# Patient Record
Sex: MALE | Race: WHITE | NOT HISPANIC OR LATINO | Employment: OTHER | URBAN - METROPOLITAN AREA
[De-identification: names, ages, dates, MRNs, and addresses within clinical notes are randomized per-mention and may not be internally consistent; named-entity substitution may affect disease eponyms.]

---

## 2023-03-29 PROBLEM — E66.01 MORBID OBESITY (MULTI): Status: ACTIVE | Noted: 2023-03-29

## 2023-03-29 PROBLEM — N40.1 ENLARGED PROSTATE WITH LOWER URINARY TRACT SYMPTOMS (LUTS): Status: ACTIVE | Noted: 2023-03-29

## 2023-03-29 PROBLEM — R82.90 ABNORMAL URINALYSIS: Status: ACTIVE | Noted: 2023-03-29

## 2023-03-29 PROBLEM — R41.3 MEMORY LOSS: Status: ACTIVE | Noted: 2023-03-29

## 2023-03-29 PROBLEM — E78.5 HYPERLIPIDEMIA: Status: ACTIVE | Noted: 2023-03-29

## 2023-03-29 PROBLEM — I48.91 ATRIAL FIBRILLATION (MULTI): Status: ACTIVE | Noted: 2023-03-29

## 2023-03-29 PROBLEM — N40.0 BENIGN PROSTATE HYPERPLASIA: Status: ACTIVE | Noted: 2023-03-29

## 2023-03-29 PROBLEM — N52.9 MALE ERECTILE DISORDER: Status: ACTIVE | Noted: 2023-03-29

## 2023-03-29 PROBLEM — R07.9 CHEST PAIN: Status: ACTIVE | Noted: 2023-03-29

## 2023-03-29 PROBLEM — I10 BENIGN ESSENTIAL HYPERTENSION: Status: ACTIVE | Noted: 2023-03-29

## 2023-03-29 PROBLEM — E11.9 CONTROLLED DIABETES MELLITUS (MULTI): Status: ACTIVE | Noted: 2023-03-29

## 2023-03-29 RX ORDER — TADALAFIL 20 MG/1
20 TABLET ORAL DAILY PRN
COMMUNITY
Start: 2022-04-27 | End: 2024-04-17 | Stop reason: ALTCHOICE

## 2023-03-29 RX ORDER — METOPROLOL TARTRATE 25 MG/1
25 TABLET, FILM COATED ORAL 2 TIMES DAILY
COMMUNITY
Start: 2015-12-14 | End: 2023-04-04 | Stop reason: SDUPTHER

## 2023-03-29 RX ORDER — BLOOD SUGAR DIAGNOSTIC
STRIP MISCELLANEOUS
COMMUNITY
Start: 2015-12-14

## 2023-03-29 RX ORDER — LOSARTAN POTASSIUM 100 MG/1
100 TABLET ORAL DAILY
COMMUNITY
Start: 2019-12-20 | End: 2023-04-04 | Stop reason: SDUPTHER

## 2023-03-29 RX ORDER — LOSARTAN POTASSIUM AND HYDROCHLOROTHIAZIDE 25; 100 MG/1; MG/1
1 TABLET ORAL DAILY
COMMUNITY
Start: 2017-03-14 | End: 2023-04-05

## 2023-03-29 RX ORDER — RIVAROXABAN 15 MG-20MG
1 KIT ORAL
COMMUNITY
Start: 2022-04-27 | End: 2023-04-05 | Stop reason: ALTCHOICE

## 2023-03-29 RX ORDER — HYDROCHLOROTHIAZIDE 25 MG/1
25 TABLET ORAL DAILY
COMMUNITY
Start: 2019-12-20 | End: 2023-04-04 | Stop reason: SDUPTHER

## 2023-03-29 RX ORDER — METFORMIN HYDROCHLORIDE 500 MG/1
500 TABLET ORAL DAILY
COMMUNITY
Start: 2017-10-18 | End: 2023-04-04 | Stop reason: SDUPTHER

## 2023-03-29 RX ORDER — TERAZOSIN 10 MG/1
1 CAPSULE ORAL DAILY
COMMUNITY
Start: 2015-12-14 | End: 2023-04-04 | Stop reason: SDUPTHER

## 2023-03-29 RX ORDER — ATORVASTATIN CALCIUM 40 MG/1
40 TABLET, FILM COATED ORAL NIGHTLY
COMMUNITY
Start: 2015-12-14 | End: 2023-04-04 | Stop reason: SDUPTHER

## 2023-04-04 ENCOUNTER — TELEPHONE (OUTPATIENT)
Dept: PRIMARY CARE | Facility: CLINIC | Age: 73
End: 2023-04-04
Payer: MEDICARE

## 2023-04-04 DIAGNOSIS — E78.2 MIXED HYPERLIPIDEMIA: ICD-10-CM

## 2023-04-04 DIAGNOSIS — E11.9 CONTROLLED TYPE 2 DIABETES MELLITUS WITHOUT COMPLICATION, WITHOUT LONG-TERM CURRENT USE OF INSULIN (MULTI): ICD-10-CM

## 2023-04-04 DIAGNOSIS — I10 BENIGN ESSENTIAL HYPERTENSION: ICD-10-CM

## 2023-04-04 DIAGNOSIS — N40.0 BENIGN PROSTATIC HYPERPLASIA, UNSPECIFIED WHETHER LOWER URINARY TRACT SYMPTOMS PRESENT: ICD-10-CM

## 2023-04-04 DIAGNOSIS — I48.0 PAROXYSMAL ATRIAL FIBRILLATION (MULTI): Primary | ICD-10-CM

## 2023-04-05 RX ORDER — HYDROCHLOROTHIAZIDE 25 MG/1
25 TABLET ORAL DAILY
Qty: 90 TABLET | Refills: 3 | Status: SHIPPED | OUTPATIENT
Start: 2023-04-05 | End: 2024-04-17 | Stop reason: SDUPTHER

## 2023-04-05 RX ORDER — ATORVASTATIN CALCIUM 40 MG/1
40 TABLET, FILM COATED ORAL NIGHTLY
Qty: 90 TABLET | Refills: 3 | Status: SHIPPED | OUTPATIENT
Start: 2023-04-05 | End: 2024-04-17 | Stop reason: SDUPTHER

## 2023-04-05 RX ORDER — TERAZOSIN 10 MG/1
10 CAPSULE ORAL DAILY
Qty: 90 CAPSULE | Refills: 3 | Status: SHIPPED | OUTPATIENT
Start: 2023-04-05 | End: 2024-04-17 | Stop reason: SDUPTHER

## 2023-04-05 RX ORDER — LOSARTAN POTASSIUM 100 MG/1
100 TABLET ORAL DAILY
Qty: 90 TABLET | Refills: 3 | Status: SHIPPED | OUTPATIENT
Start: 2023-04-05 | End: 2024-04-17 | Stop reason: SDUPTHER

## 2023-04-05 RX ORDER — METFORMIN HYDROCHLORIDE 500 MG/1
500 TABLET ORAL
Qty: 180 TABLET | Refills: 3 | Status: SHIPPED | OUTPATIENT
Start: 2023-04-05 | End: 2024-04-17 | Stop reason: SDUPTHER

## 2023-04-05 RX ORDER — METOPROLOL TARTRATE 25 MG/1
25 TABLET, FILM COATED ORAL 2 TIMES DAILY
Qty: 180 TABLET | Refills: 3 | Status: SHIPPED | OUTPATIENT
Start: 2023-04-05 | End: 2024-04-17 | Stop reason: SDUPTHER

## 2023-04-05 NOTE — TELEPHONE ENCOUNTER
Refills sent  Prior A1C <6% on current DM regimen  Normal renal function   Recent repeat CBC without persistent concerns of polycythemia   Visit to establish care with me in 3 weeks    Paul Ramirez MD   Internal Medicine-Pediatrics

## 2023-04-26 ENCOUNTER — APPOINTMENT (OUTPATIENT)
Dept: PRIMARY CARE | Facility: CLINIC | Age: 73
End: 2023-04-26
Payer: MEDICARE

## 2023-04-26 ENCOUNTER — OFFICE VISIT (OUTPATIENT)
Dept: PRIMARY CARE | Facility: CLINIC | Age: 73
End: 2023-04-26
Payer: MEDICARE

## 2023-04-26 VITALS
RESPIRATION RATE: 18 BRPM | SYSTOLIC BLOOD PRESSURE: 129 MMHG | WEIGHT: 315 LBS | BODY MASS INDEX: 47.74 KG/M2 | DIASTOLIC BLOOD PRESSURE: 71 MMHG | OXYGEN SATURATION: 97 % | HEIGHT: 68 IN

## 2023-04-26 DIAGNOSIS — E66.01 MORBID OBESITY (MULTI): ICD-10-CM

## 2023-04-26 DIAGNOSIS — D75.1 POLYCYTHEMIA: ICD-10-CM

## 2023-04-26 DIAGNOSIS — E78.49 OTHER HYPERLIPIDEMIA: ICD-10-CM

## 2023-04-26 DIAGNOSIS — E11.8 CONTROLLED TYPE 2 DIABETES MELLITUS WITH COMPLICATION, WITHOUT LONG-TERM CURRENT USE OF INSULIN (MULTI): Primary | ICD-10-CM

## 2023-04-26 DIAGNOSIS — I48.91 ATRIAL FIBRILLATION, UNSPECIFIED TYPE (MULTI): ICD-10-CM

## 2023-04-26 DIAGNOSIS — Z12.5 PROSTATE CANCER SCREENING: ICD-10-CM

## 2023-04-26 DIAGNOSIS — G47.33 OBSTRUCTIVE SLEEP APNEA SYNDROME: ICD-10-CM

## 2023-04-26 DIAGNOSIS — E55.9 MILD VITAMIN D DEFICIENCY: ICD-10-CM

## 2023-04-26 LAB
ALANINE AMINOTRANSFERASE (SGPT) (U/L) IN SER/PLAS: 21 U/L (ref 10–52)
ALBUMIN (G/DL) IN SER/PLAS: 4.2 G/DL (ref 3.4–5)
ALBUMIN (MG/L) IN URINE: 7.1 MG/L
ALBUMIN/CREATININE (UG/MG) IN URINE: 7.1 UG/MG CRT (ref 0–30)
ALKALINE PHOSPHATASE (U/L) IN SER/PLAS: 130 U/L (ref 33–136)
ANION GAP IN SER/PLAS: 16 MMOL/L (ref 10–20)
ASPARTATE AMINOTRANSFERASE (SGOT) (U/L) IN SER/PLAS: 20 U/L (ref 9–39)
BASOPHILS (10*3/UL) IN BLOOD BY AUTOMATED COUNT: 0.04 X10E9/L (ref 0–0.1)
BASOPHILS/100 LEUKOCYTES IN BLOOD BY AUTOMATED COUNT: 0.5 % (ref 0–2)
BILIRUBIN TOTAL (MG/DL) IN SER/PLAS: 1.2 MG/DL (ref 0–1.2)
CALCIDIOL (25 OH VITAMIN D3) (NG/ML) IN SER/PLAS: 27 NG/ML
CALCIUM (MG/DL) IN SER/PLAS: 9.9 MG/DL (ref 8.6–10.6)
CARBON DIOXIDE, TOTAL (MMOL/L) IN SER/PLAS: 23 MMOL/L (ref 21–32)
CHLORIDE (MMOL/L) IN SER/PLAS: 104 MMOL/L (ref 98–107)
CHOLESTEROL (MG/DL) IN SER/PLAS: 91 MG/DL (ref 0–199)
CHOLESTEROL IN HDL (MG/DL) IN SER/PLAS: 39.9 MG/DL
CHOLESTEROL/HDL RATIO: 2.3
CREATININE (MG/DL) IN SER/PLAS: 0.98 MG/DL (ref 0.5–1.3)
CREATININE (MG/DL) IN URINE: 100 MG/DL (ref 20–370)
EOSINOPHILS (10*3/UL) IN BLOOD BY AUTOMATED COUNT: 0.05 X10E9/L (ref 0–0.7)
EOSINOPHILS/100 LEUKOCYTES IN BLOOD BY AUTOMATED COUNT: 0.6 % (ref 0–6)
ERYTHROCYTE DISTRIBUTION WIDTH (RATIO) BY AUTOMATED COUNT: 14.3 % (ref 11.5–14.5)
ERYTHROCYTE MEAN CORPUSCULAR HEMOGLOBIN CONCENTRATION (G/DL) BY AUTOMATED: 36.2 G/DL (ref 32–36)
ERYTHROCYTE MEAN CORPUSCULAR VOLUME (FL) BY AUTOMATED COUNT: 93 FL (ref 80–100)
ERYTHROCYTES (10*6/UL) IN BLOOD BY AUTOMATED COUNT: 4.68 X10E12/L (ref 4.5–5.9)
GFR MALE: 82 ML/MIN/1.73M2
GLUCOSE (MG/DL) IN SER/PLAS: 94 MG/DL (ref 74–99)
HEMATOCRIT (%) IN BLOOD BY AUTOMATED COUNT: 43.7 % (ref 41–52)
HEMOGLOBIN (G/DL) IN BLOOD: 15.8 G/DL (ref 13.5–17.5)
IMMATURE GRANULOCYTES/100 LEUKOCYTES IN BLOOD BY AUTOMATED COUNT: 0.6 % (ref 0–0.9)
LDL: 32 MG/DL (ref 0–99)
LEUKOCYTES (10*3/UL) IN BLOOD BY AUTOMATED COUNT: 8.1 X10E9/L (ref 4.4–11.3)
LYMPHOCYTES (10*3/UL) IN BLOOD BY AUTOMATED COUNT: 1.51 X10E9/L (ref 1.2–4.8)
LYMPHOCYTES/100 LEUKOCYTES IN BLOOD BY AUTOMATED COUNT: 18.7 % (ref 13–44)
MONOCYTES (10*3/UL) IN BLOOD BY AUTOMATED COUNT: 0.71 X10E9/L (ref 0.1–1)
MONOCYTES/100 LEUKOCYTES IN BLOOD BY AUTOMATED COUNT: 8.8 % (ref 2–10)
NEUTROPHILS (10*3/UL) IN BLOOD BY AUTOMATED COUNT: 5.7 X10E9/L (ref 1.2–7.7)
NEUTROPHILS/100 LEUKOCYTES IN BLOOD BY AUTOMATED COUNT: 70.8 % (ref 40–80)
NRBC (PER 100 WBCS) BY AUTOMATED COUNT: 0 /100 WBC (ref 0–0)
PLATELETS (10*3/UL) IN BLOOD AUTOMATED COUNT: 160 X10E9/L (ref 150–450)
POTASSIUM (MMOL/L) IN SER/PLAS: 4.1 MMOL/L (ref 3.5–5.3)
PROTEIN TOTAL: 7 G/DL (ref 6.4–8.2)
SODIUM (MMOL/L) IN SER/PLAS: 139 MMOL/L (ref 136–145)
THYROTROPIN (MIU/L) IN SER/PLAS BY DETECTION LIMIT <= 0.05 MIU/L: 1.35 MIU/L (ref 0.44–3.98)
TRIGLYCERIDE (MG/DL) IN SER/PLAS: 98 MG/DL (ref 0–149)
UREA NITROGEN (MG/DL) IN SER/PLAS: 17 MG/DL (ref 6–23)
VLDL: 20 MG/DL (ref 0–40)

## 2023-04-26 PROCEDURE — 84154 ASSAY OF PSA FREE: CPT

## 2023-04-26 PROCEDURE — 80061 LIPID PANEL: CPT

## 2023-04-26 PROCEDURE — 1036F TOBACCO NON-USER: CPT | Performed by: STUDENT IN AN ORGANIZED HEALTH CARE EDUCATION/TRAINING PROGRAM

## 2023-04-26 PROCEDURE — 3078F DIAST BP <80 MM HG: CPT | Performed by: STUDENT IN AN ORGANIZED HEALTH CARE EDUCATION/TRAINING PROGRAM

## 2023-04-26 PROCEDURE — 3044F HG A1C LEVEL LT 7.0%: CPT | Performed by: STUDENT IN AN ORGANIZED HEALTH CARE EDUCATION/TRAINING PROGRAM

## 2023-04-26 PROCEDURE — 84153 ASSAY OF PSA TOTAL: CPT

## 2023-04-26 PROCEDURE — 82043 UR ALBUMIN QUANTITATIVE: CPT

## 2023-04-26 PROCEDURE — 80053 COMPREHEN METABOLIC PANEL: CPT

## 2023-04-26 PROCEDURE — 82306 VITAMIN D 25 HYDROXY: CPT

## 2023-04-26 PROCEDURE — 82570 ASSAY OF URINE CREATININE: CPT

## 2023-04-26 PROCEDURE — 99214 OFFICE O/P EST MOD 30 MIN: CPT | Performed by: STUDENT IN AN ORGANIZED HEALTH CARE EDUCATION/TRAINING PROGRAM

## 2023-04-26 PROCEDURE — 3074F SYST BP LT 130 MM HG: CPT | Performed by: STUDENT IN AN ORGANIZED HEALTH CARE EDUCATION/TRAINING PROGRAM

## 2023-04-26 PROCEDURE — 85025 COMPLETE CBC W/AUTO DIFF WBC: CPT

## 2023-04-26 PROCEDURE — 84443 ASSAY THYROID STIM HORMONE: CPT

## 2023-04-26 PROCEDURE — 1159F MED LIST DOCD IN RCRD: CPT | Performed by: STUDENT IN AN ORGANIZED HEALTH CARE EDUCATION/TRAINING PROGRAM

## 2023-04-26 PROCEDURE — 4010F ACE/ARB THERAPY RXD/TAKEN: CPT | Performed by: STUDENT IN AN ORGANIZED HEALTH CARE EDUCATION/TRAINING PROGRAM

## 2023-04-26 PROCEDURE — 36415 COLL VENOUS BLD VENIPUNCTURE: CPT | Performed by: STUDENT IN AN ORGANIZED HEALTH CARE EDUCATION/TRAINING PROGRAM

## 2023-04-26 PROCEDURE — 83036 HEMOGLOBIN GLYCOSYLATED A1C: CPT

## 2023-04-26 NOTE — PROGRESS NOTES
Noe Quan is a 72 y.o. male seen in Clinic at [facility] by Dr. Paul Ramirez on 04/26/23 for routine care, as well as for management of the following chronic medical conditions: morbid obesity, HTN, DLD, Afib, BPH, T2DM.     #HTN  - hydrochlorothiazide 25, Losartan 100mg daily, Metoprolol tartrate 25mg BID   - BP well controlled in office today   - RFP today with reassuring renal function     #DLD  - Atorva 40  - labs well controlled on current regimen     #Afib  - B-blocker as above, Xarelto 20mg daily     #BPH, Erectile Dysfunction  - Tadalafil 20mg, Terazosin 10mg daily     #Polycythemia  - seen by hematology, repeat labs reassuring, no additional workup at this time   - EPO 17, ferritin in 100s   - STEVE testing     #T2DM, morbid obesity   - Metformin 500mg BID   - A1C 5.9% on current regimen     Past Medical History: as above   No past medical history on file.  Subspecialty Medical Care: recent hematology     Past Surgical History:   No past surgical history on file.    Medications:  Current Outpatient Medications:     atorvastatin (Lipitor) 40 mg tablet, Take 1 tablet (40 mg) by mouth once daily at bedtime., Disp: 90 tablet, Rfl: 3    hydroCHLOROthiazide (HYDRODiuril) 25 mg tablet, Take 1 tablet (25 mg) by mouth once daily., Disp: 90 tablet, Rfl: 3    losartan (Cozaar) 100 mg tablet, Take 1 tablet (100 mg) by mouth once daily., Disp: 90 tablet, Rfl: 3    metFORMIN (Glucophage) 500 mg tablet, Take 1 tablet (500 mg) by mouth in the morning and 1 tablet (500 mg) in the evening. Take with meals., Disp: 180 tablet, Rfl: 3    metoprolol tartrate (Lopressor) 25 mg tablet, Take 1 tablet (25 mg) by mouth in the morning and 1 tablet (25 mg) before bedtime., Disp: 180 tablet, Rfl: 3    OneTouch Ultra Test strip, TEST BLOOD SUGAR ONCE A DAY, Disp: , Rfl:     rivaroxaban (Xarelto) 20 mg tablet, Take 1 tablet (20 mg) by mouth once daily., Disp: 90 tablet, Rfl: 1    tadalafil 20 mg tablet, Take 1 tablet (20 mg) by  mouth once daily as needed for erectile dysfunction., Disp: , Rfl:     terazosin (Hytrin) 10 mg capsule, Take 1 capsule (10 mg) by mouth once daily., Disp: 90 capsule, Rfl: 3  Pharmacy: [facility] ([address])    Allergies: Clarithromycin, PCN   Allergies   Allergen Reactions    Clarithromycin Nausea Only    Penicillins Rash     Immunizations: Flu 2022 UTD, COVID including bivalent booster, PCV/PPSV UTD, unknown last Tdap; Shingrix recommended     Family History:   No family history on file.    Social History:   Home/Living Situation/Falls/Safety Assessment: lives alone  Education/Employment/Work/Vocational:   Activities: limited physical activity   Drug Use: never smoker, rare alcohol use  Diet: T2DM diet discussion   Depression/Anxiety: denies   Sexuality/Contraception/Menstrual History:   Sleep: STEVE screening today     Patient Information:  Health Insurance: Medicare   Transportation: [employer] POA/Guardian: emergency contact, Niece will likely be assigned (Theresa Quiroz, [phone]); Nephew, Ralf Quan, [phone]  Contact Information: [phone]    Visit Vitals  /71 (BP Location: Left arm, Patient Position: Sitting)      PHYSICAL EXAM:   General: well appearing  male, pleasant and engaged in encounter    HEENT: NCAT, MMM  CV: irregularly irregular   PULM: CTAB, non-labored respirations   ABD: soft, obese, NT, ND   : no suprapubic or CVA tenderness   EXT: WWP, no significant edema   SKIN: no rashes noted   NEURO: A&Ox4, symmetric facies, no gross motor or sensory deficits, normal gait  PSYCH: pleasant mood, appropriate affect     Assessment/Plan    Noe Quan is a 72 y.o. male seen in Clinic at [facility] by Dr. Paul Ramirez on 04/26/23 for routine care, as well as for management of the following chronic medical conditions: morbid obesity, HTN, DLD, Afib, BPH, T2DM.     #HTN  - hydrochlorothiazide 25, Losartan 100mg daily, Metoprolol tartrate 25mg BID   - BP well  controlled in office today   - RFP today with reassuring renal function     #DLD  - Atorva 40  - labs well controlled on current regimen     #Afib  - B-blocker as above, Xarelto 20mg daily     #BPH, Erectile Dysfunction  - Tadalafil 20mg, Terazosin 10mg daily     #Polycythemia  - seen by hematology, repeat labs reassuring, no additional workup at this time   - EPO 17, ferritin in 100s   - STEVE testing     #T2DM, morbid obesity   - Metformin 500mg BID   - A1C 5.9% on current regimen     #Health Maintenance    Cancer Screening  - Colorectal Cancer Screening: unclear, discuss at CPE   - Lung Cancer Screening: non-smoker   - Prostate Cancer Screening: PSA today     Laboratory Screening  - Lipid Screen: on statin   - ASCVD Score: on statin   - A1C, glucose screen: labs today   - STI, HIV, Hep B screen: defers  - Hep C screen: defers    Imaging Screening  - AAA screening: non-smoker     Immunizations:   - Influenza: annual   - COVID: has received bivalent booster   - Tdap: unknown  - Prevnar, Pneumovax: complete   - Shingrix: recommended     Other Screening  - Health Literacy Assessment: adequate   - Depression screen: negative   - Home safety/partner violence screen: lives alone   - Hearing/Vision screens:   - Alcohol/tobacco/drug use screen: non-smoker   - Healthcare POA/Advanced Directives: niece, nephew     Referrals: labs, sleep study     Patient Discussion:    Please call back the office with any questions at [phone]. In the case of an emergency, please call [phone] or go to the nearest Emergency Department.      Paul Ramirez MD  Internal Medicine-Pediatrics  [facility] [address], [address]  P: [phone], F: [phone]

## 2023-04-27 ENCOUNTER — APPOINTMENT (OUTPATIENT)
Dept: PRIMARY CARE | Facility: CLINIC | Age: 73
End: 2023-04-27
Payer: MEDICARE

## 2023-04-27 LAB
ESTIMATED AVERAGE GLUCOSE FOR HBA1C: 123 MG/DL
HEMOGLOBIN A1C/HEMOGLOBIN TOTAL IN BLOOD: 5.9 %

## 2023-04-29 LAB
PROSTATE SPECIFIC AG (NG/ML) IN SER/PLAS: 3.2 NG/ML (ref 0–4)
PROSTATE SPECIFIC AG FREE (NG/ML) IN SER/PLAS: 1.1 NG/ML
PROSTATE SPECIFIC AG FREE/PROSTATE SPECIFIC AG TOTAL IN SER/PLAS: 34 %

## 2023-05-23 DIAGNOSIS — E66.01 MORBID OBESITY (MULTI): ICD-10-CM

## 2023-05-23 DIAGNOSIS — G47.33 OBSTRUCTIVE SLEEP APNEA SYNDROME: ICD-10-CM

## 2023-08-15 ENCOUNTER — TELEPHONE (OUTPATIENT)
Dept: PRIMARY CARE | Facility: CLINIC | Age: 73
End: 2023-08-15

## 2023-09-10 PROBLEM — E11.9 ABNORMAL METABOLIC STATE DUE TO DIABETES MELLITUS (MULTI): Status: ACTIVE | Noted: 2023-09-10

## 2023-09-10 PROBLEM — H04.129 TEAR FILM INSUFFICIENCY: Status: ACTIVE | Noted: 2023-09-10

## 2023-09-10 PROBLEM — H25.10 NUCLEAR SENILE CATARACT: Status: ACTIVE | Noted: 2023-09-10

## 2023-09-10 PROBLEM — H52.7 UNSPECIFIED DISORDER OF REFRACTION: Status: ACTIVE | Noted: 2023-09-10

## 2023-10-21 DIAGNOSIS — I48.0 PAROXYSMAL ATRIAL FIBRILLATION (MULTI): ICD-10-CM

## 2023-10-21 RX ORDER — RIVAROXABAN 20 MG/1
20 TABLET, FILM COATED ORAL DAILY
Qty: 90 TABLET | Refills: 1 | Status: SHIPPED | OUTPATIENT
Start: 2023-10-21 | End: 2023-10-26 | Stop reason: SDUPTHER

## 2023-10-26 DIAGNOSIS — I48.0 PAROXYSMAL ATRIAL FIBRILLATION (MULTI): ICD-10-CM

## 2023-10-31 ENCOUNTER — LAB (OUTPATIENT)
Dept: LAB | Facility: LAB | Age: 73
End: 2023-10-31
Payer: MEDICARE

## 2023-10-31 ENCOUNTER — OFFICE VISIT (OUTPATIENT)
Dept: PRIMARY CARE | Facility: CLINIC | Age: 73
End: 2023-10-31
Payer: MEDICARE

## 2023-10-31 VITALS
HEIGHT: 68 IN | HEART RATE: 67 BPM | SYSTOLIC BLOOD PRESSURE: 118 MMHG | DIASTOLIC BLOOD PRESSURE: 56 MMHG | BODY MASS INDEX: 50.78 KG/M2 | OXYGEN SATURATION: 95 %

## 2023-10-31 DIAGNOSIS — R25.1 TREMOR: ICD-10-CM

## 2023-10-31 DIAGNOSIS — E55.9 MILD VITAMIN D DEFICIENCY: ICD-10-CM

## 2023-10-31 DIAGNOSIS — R32 URINARY INCONTINENCE, UNSPECIFIED TYPE: ICD-10-CM

## 2023-10-31 DIAGNOSIS — I48.91 ATRIAL FIBRILLATION, UNSPECIFIED TYPE (MULTI): ICD-10-CM

## 2023-10-31 DIAGNOSIS — E11.8 CONTROLLED TYPE 2 DIABETES MELLITUS WITH COMPLICATION, WITHOUT LONG-TERM CURRENT USE OF INSULIN (MULTI): ICD-10-CM

## 2023-10-31 DIAGNOSIS — Z23 IMMUNIZATION DUE: ICD-10-CM

## 2023-10-31 DIAGNOSIS — E11.8 CONTROLLED TYPE 2 DIABETES MELLITUS WITH COMPLICATION, WITHOUT LONG-TERM CURRENT USE OF INSULIN (MULTI): Primary | ICD-10-CM

## 2023-10-31 DIAGNOSIS — Z12.11 COLON CANCER SCREENING: ICD-10-CM

## 2023-10-31 LAB
25(OH)D3 SERPL-MCNC: 36 NG/ML (ref 30–100)
ALBUMIN SERPL BCP-MCNC: 4.1 G/DL (ref 3.4–5)
ALP SERPL-CCNC: 124 U/L (ref 33–136)
ALT SERPL W P-5'-P-CCNC: 24 U/L (ref 10–52)
ANION GAP SERPL CALC-SCNC: 14 MMOL/L (ref 10–20)
AST SERPL W P-5'-P-CCNC: 22 U/L (ref 9–39)
BILIRUB SERPL-MCNC: 1.1 MG/DL (ref 0–1.2)
BUN SERPL-MCNC: 15 MG/DL (ref 6–23)
CALCIUM SERPL-MCNC: 9.9 MG/DL (ref 8.6–10.6)
CHLORIDE SERPL-SCNC: 103 MMOL/L (ref 98–107)
CO2 SERPL-SCNC: 28 MMOL/L (ref 21–32)
CREAT SERPL-MCNC: 1.16 MG/DL (ref 0.5–1.3)
GFR SERPL CREATININE-BSD FRML MDRD: 67 ML/MIN/1.73M*2
GLUCOSE SERPL-MCNC: 100 MG/DL (ref 74–99)
POTASSIUM SERPL-SCNC: 4.3 MMOL/L (ref 3.5–5.3)
PROT SERPL-MCNC: 7 G/DL (ref 6.4–8.2)
SODIUM SERPL-SCNC: 141 MMOL/L (ref 136–145)

## 2023-10-31 PROCEDURE — 99214 OFFICE O/P EST MOD 30 MIN: CPT | Performed by: STUDENT IN AN ORGANIZED HEALTH CARE EDUCATION/TRAINING PROGRAM

## 2023-10-31 PROCEDURE — 4010F ACE/ARB THERAPY RXD/TAKEN: CPT | Performed by: STUDENT IN AN ORGANIZED HEALTH CARE EDUCATION/TRAINING PROGRAM

## 2023-10-31 PROCEDURE — 82306 VITAMIN D 25 HYDROXY: CPT

## 2023-10-31 PROCEDURE — 3074F SYST BP LT 130 MM HG: CPT | Performed by: STUDENT IN AN ORGANIZED HEALTH CARE EDUCATION/TRAINING PROGRAM

## 2023-10-31 PROCEDURE — 3044F HG A1C LEVEL LT 7.0%: CPT | Performed by: STUDENT IN AN ORGANIZED HEALTH CARE EDUCATION/TRAINING PROGRAM

## 2023-10-31 PROCEDURE — 83036 HEMOGLOBIN GLYCOSYLATED A1C: CPT

## 2023-10-31 PROCEDURE — 80053 COMPREHEN METABOLIC PANEL: CPT

## 2023-10-31 PROCEDURE — 90662 IIV NO PRSV INCREASED AG IM: CPT | Performed by: STUDENT IN AN ORGANIZED HEALTH CARE EDUCATION/TRAINING PROGRAM

## 2023-10-31 PROCEDURE — 36415 COLL VENOUS BLD VENIPUNCTURE: CPT

## 2023-10-31 PROCEDURE — 81001 URINALYSIS AUTO W/SCOPE: CPT

## 2023-10-31 PROCEDURE — 1036F TOBACCO NON-USER: CPT | Performed by: STUDENT IN AN ORGANIZED HEALTH CARE EDUCATION/TRAINING PROGRAM

## 2023-10-31 PROCEDURE — 1159F MED LIST DOCD IN RCRD: CPT | Performed by: STUDENT IN AN ORGANIZED HEALTH CARE EDUCATION/TRAINING PROGRAM

## 2023-10-31 PROCEDURE — 3078F DIAST BP <80 MM HG: CPT | Performed by: STUDENT IN AN ORGANIZED HEALTH CARE EDUCATION/TRAINING PROGRAM

## 2023-10-31 PROCEDURE — G0008 ADMIN INFLUENZA VIRUS VAC: HCPCS | Performed by: STUDENT IN AN ORGANIZED HEALTH CARE EDUCATION/TRAINING PROGRAM

## 2023-10-31 NOTE — PROGRESS NOTES
Noe Quan is a 73 y.o. male seen in Clinic at [facility] [place] by Dr. Paul Ramirez on 10/31/23 for routine care, as well as for management of the following chronic medical conditions: morbid obesity, HTN, DLD, Afib, BPH, T2DM. He presents today for 6 month follow up visit.    CONCERNS:   - R Arm Tremor; patient has difficult discerning rest vs. Intention   - Dizziness, mainly with positional changes; patient is on several blood pressure medications including B-blocker which inhibits his HR compensatory changes with getting up   - Gait instability, started using a cane for balance and positional support in last 2-3 years   Additionally--patient has slight cogwheeling on exam (not pronounced), is softer spoken (unclear if this has evolved or progressed, patient does not believe so), complains of some intermittent urinary symptoms/incontinence  Given constellation of symptoms, will refer to neurology for any additional thoughts related to possible underlying movement disorder   [  ] neuro referral  If not, positional changes likely more in setting of BP meds and will manage/adjust accordingly; gradual positional changes recommended today     CHRONIC MEDICAL CONDITIONS:   #HTN  - hydrochlorothiazide 25, Losartan 100mg daily, Metoprolol tartrate 25mg BID   - BP well controlled in office today   - RFP today with reassuring renal function   [  ] may consider dose reduction of Metoprolol given above symptoms     #DLD  - Atorva 40  - labs well controlled on current regimen     #Afib  - B-blocker as above, Xarelto 20mg daily   [  ] Handicap disability placard     #BPH, Erectile Dysfunction, Urinary Incontinence   - Tadalafil 20mg, Terazosin 10mg daily (takes at night before bed to try and mitigate side effects)   [  ] UA with labs today     #Polycythemia  - seen by hematology, repeat labs reassuring, no additional workup at this time   - EPO 17, ferritin in 100s   - STEVE testing, likely etiology; patient REFUSES and does not  wish to see sleep medicine at this time    #T2DM, morbid obesity   - Metformin 500mg BID   - ARB for renoprotection   - A1C 5.9% on current regimen   [  ] repeat A1C, CMP today     Past Medical History: as above   No past medical history on file.  Subspecialty Medical Care: prior hematology     Past Surgical History:   No past surgical history on file.    Medications:  Current Outpatient Medications:     atorvastatin (Lipitor) 40 mg tablet, Take 1 tablet (40 mg) by mouth once daily at bedtime., Disp: 90 tablet, Rfl: 3    hydroCHLOROthiazide (HYDRODiuril) 25 mg tablet, Take 1 tablet (25 mg) by mouth once daily., Disp: 90 tablet, Rfl: 3    losartan (Cozaar) 100 mg tablet, Take 1 tablet (100 mg) by mouth once daily., Disp: 90 tablet, Rfl: 3    metFORMIN (Glucophage) 500 mg tablet, Take 1 tablet (500 mg) by mouth in the morning and 1 tablet (500 mg) in the evening. Take with meals., Disp: 180 tablet, Rfl: 3    metoprolol tartrate (Lopressor) 25 mg tablet, Take 1 tablet (25 mg) by mouth in the morning and 1 tablet (25 mg) before bedtime., Disp: 180 tablet, Rfl: 3    rivaroxaban (Xarelto) 20 mg tablet, Take 1 tablet (20 mg) by mouth once daily., Disp: 30 tablet, Rfl: 3    terazosin (Hytrin) 10 mg capsule, Take 1 capsule (10 mg) by mouth once daily., Disp: 90 capsule, Rfl: 3    OneTouch Ultra Test strip, TEST BLOOD SUGAR ONCE A DAY, Disp: , Rfl:     tadalafil 20 mg tablet, Take 1 tablet (20 mg) by mouth once daily as needed for erectile dysfunction., Disp: , Rfl:   Ph[facility]acy: [facility] ([facility])    Allergies: Clarithromycin, PCN   Allergies   Allergen Reactions    Clarithromycin Nausea Only    Penicillins Rash     Immunizations:   - Flu today  - Staying UTD with COVID boosters recommended  - RSV recommedned  - PCV/PPSV UTD  - unknown last Tdap  - Shingrix previously recommended     Family History:   No family history on file.    Social History:   Home/Living Situation/Falls/Safety Assessment: lives  "alone  Education/Employment/Work/Vocational:   Activities: limited physical activity   Drug Use: never smoker, rare alcohol use  Diet: T2DM diet discussion   Depression/Anxiety: denies   Sexuality/Contraception/Menstrual History:   Sleep: STEVE concerns but defers workup     Patient Information:  Health Insurance: Medicare   Transportation: [employer]   Healthcare POA/Guardian: emergency contact, Niece will likely be assigned (Theresa Quiroz, [phone]); Nephew, Ralf Quan, [phone]  Contact Information: [phone]    Visit Vitals  /56   Pulse 67   Ht 1.727 m (5' 8\")   SpO2 95%   BMI 50.78 kg/m²   Smoking Status Never   BSA 2.7 m²      PHYSICAL EXAM:   General: well appearing  male, pleasant and engaged in encounter    HEENT: NCAT, MMM, large neck circumference   CV: irregularly irregular   PULM: CTAB, non-labored respirations   ABD: soft, obese, NT, ND   : no suprapubic or CVA tenderness   EXT: WWP, no significant edema   SKIN: no rashes noted   NEURO: A&Ox4, symmetric facies, soft spoken, slight cogwheeling noted in upper extremities (though very subtle), no significant tremor noted during exam today, no gross motor or sensory deficits, cane for ambulation   PSYCH: pleasant mood, appropriate affect     Assessment/Plan    Noe Quan is a 73 y.o. male seen in Clinic at [facility] [place]. Paul Ramirez on 10/31/23 for routine care, as well as for management of the following chronic medical conditions: morbid obesity, HTN, DLD, Afib, BPH, T2DM. He presents today for 6 month follow up visit.    CONCERNS:   - R Arm Tremor; patient has difficult discerning rest vs. Intention   - Dizziness, mainly with positional changes; patient is on several blood pressure medications including B-blocker which inhibits his HR compensatory changes with getting up   - Gait instability, started using a cane for balance and positional support in last 2-3 years   Additionally--patient has slight cogwheeling on " exam (not pronounced), is softer spoken (unclear if this has evolved or progressed, patient does not believe so), complains of some intermittent urinary symptoms/incontinence  Given constellation of symptoms, will refer to neurology for any additional thoughts related to possible underlying movement disorder   [  ] neuro referral  If not, positional changes likely more in setting of BP meds and will manage/adjust accordingly; gradual positional changes recommended today     CHRONIC MEDICAL CONDITIONS:   #HTN  - hydrochlorothiazide 25, Losartan 100mg daily, Metoprolol tartrate 25mg BID   - BP well controlled in office today   - RFP today with reassuring renal function   [  ] may consider dose reduction of Metoprolol given above symptoms     #DLD  - Atorva 40  - labs well controlled on current regimen     #Afib  - B-blocker as above, Xarelto 20mg daily   [  ] Handicap disability placard     #BPH, Erectile Dysfunction, Urinary Incontinence   - Tadalafil 20mg, Terazosin 10mg daily (takes at night before bed to try and mitigate side effects)   [  ] UA with labs today     #Polycythemia  - seen by hematology, repeat labs reassuring, no additional workup at this time   - EPO 17, ferritin in 100s   - STEVE testing, likely etiology; patient REFUSES and does not wish to see sleep medicine at this time    #T2DM, morbid obesity   - Metformin 500mg BID   - ARB for renoprotection   - A1C 5.9% on current regimen   [  ] repeat A1C, CMP today     #Health Maintenance    Cancer Screening  - Colorectal Cancer Screening: unclear, Cologuard ordered in 2022 never completed, re-order today   - Lung Cancer Screening: non-smoker   - Prostate Cancer Screening: reassuring 04/2023    Laboratory Screening  - Lipid Screen: on statin   - ASCVD Score: on statin   - A1C, glucose screen: labs today   - STI, HIV, Hep B screen: defers  - Hep C screen: defers    Imaging Screening  - AAA screening: non-smoker     Immunizations:   - Influenza: annual, given  today  - COVID: recommend staying UTD with booster  - RSV: recommended   - Tdap: unknown  - Prevnar, Pneumovax: complete   - Shingrix: recommended     Other Screening  - Health Literacy Assessment: adequate   - Depression screen: negative   - Home safety/partner violence screen: lives alone   - Hearing/Vision screens:   - Alcohol/tobacco/drug use screen: non-smoker   - Healthcare POA/Advanced Directives: niece, nephew     Referrals: labs, Neurology, Cologuard, Disability Placard, Flu shot     RTC for CPE/MCW in 6 months.     Patient Discussion:    Please call back the office with any questions at [phone]. In the case of an emergency, please call 911 or go to the nearest Emergency Department.      Paul Ramirez MD  Internal Medicine-Pediatrics  [facility] [address], [address]  P: [phone], F: [phone]

## 2023-11-01 LAB
APPEARANCE UR: ABNORMAL
BILIRUB UR STRIP.AUTO-MCNC: NEGATIVE MG/DL
COLOR UR: YELLOW
EST. AVERAGE GLUCOSE BLD GHB EST-MCNC: 123 MG/DL
GLUCOSE UR STRIP.AUTO-MCNC: NEGATIVE MG/DL
HBA1C MFR BLD: 5.9 %
KETONES UR STRIP.AUTO-MCNC: NEGATIVE MG/DL
LEUKOCYTE ESTERASE UR QL STRIP.AUTO: ABNORMAL
NITRITE UR QL STRIP.AUTO: NEGATIVE
PH UR STRIP.AUTO: 5 [PH]
PROT UR STRIP.AUTO-MCNC: NEGATIVE MG/DL
RBC # UR STRIP.AUTO: ABNORMAL /UL
RBC #/AREA URNS AUTO: ABNORMAL /HPF
SP GR UR STRIP.AUTO: 1.02
SQUAMOUS #/AREA URNS AUTO: ABNORMAL /HPF
UROBILINOGEN UR STRIP.AUTO-MCNC: <2 MG/DL
WBC #/AREA URNS AUTO: ABNORMAL /HPF

## 2023-11-01 NOTE — RESULT ENCOUNTER NOTE
+                                                                                                      GAVE HIM THE INFO.

## 2024-04-16 ENCOUNTER — APPOINTMENT (OUTPATIENT)
Dept: PRIMARY CARE | Facility: CLINIC | Age: 74
End: 2024-04-16
Payer: MEDICARE

## 2024-04-17 ENCOUNTER — OFFICE VISIT (OUTPATIENT)
Dept: PRIMARY CARE | Facility: CLINIC | Age: 74
End: 2024-04-17
Payer: MEDICARE

## 2024-04-17 ENCOUNTER — LAB (OUTPATIENT)
Dept: LAB | Facility: LAB | Age: 74
End: 2024-04-17
Payer: MEDICARE

## 2024-04-17 DIAGNOSIS — E55.9 MILD VITAMIN D DEFICIENCY: ICD-10-CM

## 2024-04-17 DIAGNOSIS — R35.89 POLYURIA: ICD-10-CM

## 2024-04-17 DIAGNOSIS — E78.2 MIXED HYPERLIPIDEMIA: ICD-10-CM

## 2024-04-17 DIAGNOSIS — Z11.4 ENCOUNTER FOR SCREENING FOR HIV: ICD-10-CM

## 2024-04-17 DIAGNOSIS — Z11.59 NEED FOR HEPATITIS B SCREENING TEST: ICD-10-CM

## 2024-04-17 DIAGNOSIS — N40.0 BENIGN PROSTATIC HYPERPLASIA, UNSPECIFIED WHETHER LOWER URINARY TRACT SYMPTOMS PRESENT: ICD-10-CM

## 2024-04-17 DIAGNOSIS — R07.9 CHEST PAIN, UNSPECIFIED TYPE: ICD-10-CM

## 2024-04-17 DIAGNOSIS — E11.9 CONTROLLED TYPE 2 DIABETES MELLITUS WITHOUT COMPLICATION, WITHOUT LONG-TERM CURRENT USE OF INSULIN (MULTI): ICD-10-CM

## 2024-04-17 DIAGNOSIS — Z12.5 PROSTATE CANCER SCREENING: ICD-10-CM

## 2024-04-17 DIAGNOSIS — I48.0 PAROXYSMAL ATRIAL FIBRILLATION (MULTI): ICD-10-CM

## 2024-04-17 DIAGNOSIS — D75.1 POLYCYTHEMIA: ICD-10-CM

## 2024-04-17 DIAGNOSIS — I10 BENIGN ESSENTIAL HYPERTENSION: ICD-10-CM

## 2024-04-17 DIAGNOSIS — Z11.59 ENCOUNTER FOR HEPATITIS C SCREENING TEST FOR LOW RISK PATIENT: ICD-10-CM

## 2024-04-17 DIAGNOSIS — N39.41 URGE INCONTINENCE: ICD-10-CM

## 2024-04-17 DIAGNOSIS — Z00.00 MEDICARE ANNUAL WELLNESS VISIT, SUBSEQUENT: Primary | ICD-10-CM

## 2024-04-17 LAB
25(OH)D3 SERPL-MCNC: 37 NG/ML (ref 30–100)
ALBUMIN SERPL BCP-MCNC: 3.8 G/DL (ref 3.4–5)
ALP SERPL-CCNC: 111 U/L (ref 33–136)
ALT SERPL W P-5'-P-CCNC: 21 U/L (ref 10–52)
ANION GAP SERPL CALC-SCNC: 15 MMOL/L (ref 10–20)
APPEARANCE UR: ABNORMAL
AST SERPL W P-5'-P-CCNC: 22 U/L (ref 9–39)
BILIRUB SERPL-MCNC: 1.2 MG/DL (ref 0–1.2)
BILIRUB UR STRIP.AUTO-MCNC: NEGATIVE MG/DL
BUN SERPL-MCNC: 18 MG/DL (ref 6–23)
CALCIUM SERPL-MCNC: 9.4 MG/DL (ref 8.6–10.6)
CHLORIDE SERPL-SCNC: 105 MMOL/L (ref 98–107)
CHOLEST SERPL-MCNC: 88 MG/DL (ref 0–199)
CHOLESTEROL/HDL RATIO: 2.2
CO2 SERPL-SCNC: 24 MMOL/L (ref 21–32)
COLOR UR: ABNORMAL
CREAT SERPL-MCNC: 1.16 MG/DL (ref 0.5–1.3)
CREAT UR-MCNC: 168.3 MG/DL (ref 20–370)
EGFRCR SERPLBLD CKD-EPI 2021: 67 ML/MIN/1.73M*2
GLUCOSE SERPL-MCNC: 89 MG/DL (ref 74–99)
GLUCOSE UR STRIP.AUTO-MCNC: NORMAL MG/DL
HBV SURFACE AG SERPL QL IA: NONREACTIVE
HCV AB SER QL: NONREACTIVE
HDLC SERPL-MCNC: 40.6 MG/DL
KETONES UR STRIP.AUTO-MCNC: NEGATIVE MG/DL
LDLC SERPL CALC-MCNC: 27 MG/DL
LEUKOCYTE ESTERASE UR QL STRIP.AUTO: ABNORMAL
MICROALBUMIN UR-MCNC: 12.2 MG/L
MICROALBUMIN/CREAT UR: 7.2 UG/MG CREAT
NITRITE UR QL STRIP.AUTO: NEGATIVE
NON HDL CHOLESTEROL: 47 MG/DL (ref 0–149)
PH UR STRIP.AUTO: 5 [PH]
POTASSIUM SERPL-SCNC: 4.2 MMOL/L (ref 3.5–5.3)
PROT SERPL-MCNC: 6.7 G/DL (ref 6.4–8.2)
PROT UR STRIP.AUTO-MCNC: ABNORMAL MG/DL
RBC # UR STRIP.AUTO: NEGATIVE /UL
RBC #/AREA URNS AUTO: NORMAL /HPF
SODIUM SERPL-SCNC: 140 MMOL/L (ref 136–145)
SP GR UR STRIP.AUTO: 1.02
TRIGL SERPL-MCNC: 103 MG/DL (ref 0–149)
TSH SERPL-ACNC: 1.53 MIU/L (ref 0.44–3.98)
UROBILINOGEN UR STRIP.AUTO-MCNC: NORMAL MG/DL
VLDL: 21 MG/DL (ref 0–40)
WBC #/AREA URNS AUTO: NORMAL /HPF

## 2024-04-17 PROCEDURE — 82570 ASSAY OF URINE CREATININE: CPT

## 2024-04-17 PROCEDURE — 4010F ACE/ARB THERAPY RXD/TAKEN: CPT | Performed by: STUDENT IN AN ORGANIZED HEALTH CARE EDUCATION/TRAINING PROGRAM

## 2024-04-17 PROCEDURE — 36415 COLL VENOUS BLD VENIPUNCTURE: CPT

## 2024-04-17 PROCEDURE — 3078F DIAST BP <80 MM HG: CPT | Performed by: STUDENT IN AN ORGANIZED HEALTH CARE EDUCATION/TRAINING PROGRAM

## 2024-04-17 PROCEDURE — G0439 PPPS, SUBSEQ VISIT: HCPCS | Performed by: STUDENT IN AN ORGANIZED HEALTH CARE EDUCATION/TRAINING PROGRAM

## 2024-04-17 PROCEDURE — 1170F FXNL STATUS ASSESSED: CPT | Performed by: STUDENT IN AN ORGANIZED HEALTH CARE EDUCATION/TRAINING PROGRAM

## 2024-04-17 PROCEDURE — 81001 URINALYSIS AUTO W/SCOPE: CPT

## 2024-04-17 PROCEDURE — 86803 HEPATITIS C AB TEST: CPT

## 2024-04-17 PROCEDURE — 82043 UR ALBUMIN QUANTITATIVE: CPT

## 2024-04-17 PROCEDURE — G0103 PSA SCREENING: HCPCS

## 2024-04-17 PROCEDURE — 87340 HEPATITIS B SURFACE AG IA: CPT

## 2024-04-17 PROCEDURE — 83036 HEMOGLOBIN GLYCOSYLATED A1C: CPT

## 2024-04-17 PROCEDURE — 93000 ELECTROCARDIOGRAM COMPLETE: CPT | Performed by: STUDENT IN AN ORGANIZED HEALTH CARE EDUCATION/TRAINING PROGRAM

## 2024-04-17 PROCEDURE — 80061 LIPID PANEL: CPT

## 2024-04-17 PROCEDURE — 84154 ASSAY OF PSA FREE: CPT

## 2024-04-17 PROCEDURE — 3048F LDL-C <100 MG/DL: CPT | Performed by: STUDENT IN AN ORGANIZED HEALTH CARE EDUCATION/TRAINING PROGRAM

## 2024-04-17 PROCEDURE — 85027 COMPLETE CBC AUTOMATED: CPT

## 2024-04-17 PROCEDURE — 3074F SYST BP LT 130 MM HG: CPT | Performed by: STUDENT IN AN ORGANIZED HEALTH CARE EDUCATION/TRAINING PROGRAM

## 2024-04-17 PROCEDURE — 3044F HG A1C LEVEL LT 7.0%: CPT | Performed by: STUDENT IN AN ORGANIZED HEALTH CARE EDUCATION/TRAINING PROGRAM

## 2024-04-17 PROCEDURE — 80053 COMPREHEN METABOLIC PANEL: CPT

## 2024-04-17 PROCEDURE — 1159F MED LIST DOCD IN RCRD: CPT | Performed by: STUDENT IN AN ORGANIZED HEALTH CARE EDUCATION/TRAINING PROGRAM

## 2024-04-17 PROCEDURE — 84443 ASSAY THYROID STIM HORMONE: CPT

## 2024-04-17 PROCEDURE — 87389 HIV-1 AG W/HIV-1&-2 AB AG IA: CPT

## 2024-04-17 PROCEDURE — 3061F NEG MICROALBUMINURIA REV: CPT | Performed by: STUDENT IN AN ORGANIZED HEALTH CARE EDUCATION/TRAINING PROGRAM

## 2024-04-17 PROCEDURE — 99214 OFFICE O/P EST MOD 30 MIN: CPT | Performed by: STUDENT IN AN ORGANIZED HEALTH CARE EDUCATION/TRAINING PROGRAM

## 2024-04-17 PROCEDURE — 82306 VITAMIN D 25 HYDROXY: CPT

## 2024-04-17 RX ORDER — TERAZOSIN 10 MG/1
10 CAPSULE ORAL DAILY
Qty: 90 CAPSULE | Refills: 3 | Status: SHIPPED | OUTPATIENT
Start: 2024-04-17 | End: 2025-04-12

## 2024-04-17 RX ORDER — ATORVASTATIN CALCIUM 40 MG/1
40 TABLET, FILM COATED ORAL NIGHTLY
Qty: 90 TABLET | Refills: 3 | Status: SHIPPED | OUTPATIENT
Start: 2024-04-17 | End: 2025-04-12

## 2024-04-17 RX ORDER — METOPROLOL TARTRATE 25 MG/1
25 TABLET, FILM COATED ORAL 2 TIMES DAILY
Qty: 180 TABLET | Refills: 3 | Status: SHIPPED | OUTPATIENT
Start: 2024-04-17 | End: 2025-04-12

## 2024-04-17 RX ORDER — HYDROCHLOROTHIAZIDE 25 MG/1
25 TABLET ORAL DAILY
Qty: 90 TABLET | Refills: 3 | Status: SHIPPED | OUTPATIENT
Start: 2024-04-17 | End: 2025-04-12

## 2024-04-17 RX ORDER — LOSARTAN POTASSIUM 100 MG/1
100 TABLET ORAL DAILY
Qty: 90 TABLET | Refills: 3 | Status: SHIPPED | OUTPATIENT
Start: 2024-04-17 | End: 2025-04-12

## 2024-04-17 RX ORDER — METFORMIN HYDROCHLORIDE 500 MG/1
500 TABLET ORAL
Qty: 180 TABLET | Refills: 3 | Status: SHIPPED | OUTPATIENT
Start: 2024-04-17 | End: 2025-04-12

## 2024-04-17 NOTE — PATIENT INSTRUCTIONS
Labs in [address]    EKG in office today overall similar to prior but with few changes   Stress testing given the chest pain to further evaluate possibly underlying cardiac etiology  Call [phone] to schedule     Urine studies with blood work  We will see based on that if we need any imaging of the urinary tract  Given how concerning this is for you and the fact that I think it is quite multifactorial, I would advise seeing urology for discussion     Medication refills sent to pharmacy     Try to complete COLOGUARD testing     Advise updated COVID booster this spring  RSV vaccine if interested     Best,   Dr. Ramirez

## 2024-04-17 NOTE — PROGRESS NOTES
"Noe Quan is a 73 y.o. male seen in Clinic at [facility] [place] by Dr. Paul Ramirez on 04/17/24 for routine care, as well as for management of the following chronic medical conditions: morbid obesity, HTN, DLD, Afib, BPH, T2DM. He presents today for CPE/MCW visit.     ACUTE CONCERNS:   #Incontinence--likely multifactorial including BPH, medication effects (alpha blocker, longstanding; diuretic), obesity, STEVE with CPAP intolerance  - predominately urge per description   - typically \"leaking\" and \"urgency\"   - not large volume  - abnormal UA in fall with repeat ordered but not pursued   [  ] repeat urine studies today  - known T2DM  [  ] updated CMP, A1C  - known BPH on longstanding alpha-blocker   [  ] urology referral for additional recommendations     NOTE: patient with gait instability concerns in the fall; concurrent R arm tremor, some positional dizziness (particularly in AM when waking); had discussed possible neurology evaluation for early movement disorder, which he deferred. Given urinary symptoms as above, consider diagnosis such as NPH, however patient notes gait instability has improved, NO recent falls, not mentation concerns, making this less likely. If ongoing concerns, however, may consider non-contrast CT head to further characterize/evaluate.   [  ] continue to monitor gait, mental status; if any ongoing concerns and with concurrent urinary complaints, may consider CT head    #R Sided Chest Pain  - intermittent, around once per week per patient   - describes as \"feeling like a pulled muscle\"   - patient profoundly deconditioned making it difficult to determine if occurring with exertion; does also occur at rest   - no recent EKG available for review (last seen in prior EMR from 2015)  [  ] repeat EKG today: overall similar morphology, T wave inversion in lead 3, bradycardic and irregular   [  ] stress echo to further characterize     CHRONIC MEDICAL CONDITIONS:   #HTN  - hydrochlorothiazide 25, " Losartan 100mg daily, Metoprolol tartrate 25mg BID   - BP well controlled in office today   - RFP today with reassuring renal function   [  ] may consider dose reduction of Metoprolol given above symptoms     #DLD  - Atorva 40  - labs well controlled on current regimen     #Afib  - B-blocker as above, Xarelto 20mg daily     #BPH, Erectile Dysfunction, Urinary Incontinence   - Terazosin 10mg daily (takes at night before bed to try and mitigate side effects)   [  ] UA with labs today   [  ] urology referral as above     #Polycythemia, IMPROVED   - seen by hematology, repeat labs reassuring, no additional workup at this time   - EPO 17, ferritin in 100s   - STEVE testing, likely etiology; patient REFUSES and does not wish to see sleep medicine at this time    #T2DM, morbid obesity   - Metformin 500mg BID   - ARB for renoprotection   - A1C 5.9% on current regimen (labs 04/2024)     Past Medical History: as above   No past medical history on file.  Subspecialty Medical Care: prior hematology     Past Surgical History:   No past surgical history on file.    Medications:  Current Outpatient Medications:     atorvastatin (Lipitor) 40 mg tablet, Take 1 tablet (40 mg) by mouth once daily at bedtime., Disp: 90 tablet, Rfl: 3    hydroCHLOROthiazide (HYDRODiuril) 25 mg tablet, Take 1 tablet (25 mg) by mouth once daily., Disp: 90 tablet, Rfl: 3    losartan (Cozaar) 100 mg tablet, Take 1 tablet (100 mg) by mouth once daily., Disp: 90 tablet, Rfl: 3    metFORMIN (Glucophage) 500 mg tablet, Take 1 tablet (500 mg) by mouth 2 times a day with meals., Disp: 180 tablet, Rfl: 3    metoprolol tartrate (Lopressor) 25 mg tablet, Take 1 tablet (25 mg) by mouth 2 times a day., Disp: 180 tablet, Rfl: 3    OneTouch Ultra Test strip, TEST BLOOD SUGAR ONCE A DAY, Disp: , Rfl:     rivaroxaban (Xarelto) 20 mg tablet, Take 1 tablet (20 mg) by mouth once daily., Disp: 90 tablet, Rfl: 3    terazosin (Hytrin) 10 mg capsule, Take 1 capsule (10 mg) by  mouth once daily., Disp: 90 capsule, Rfl: 3  Pharmacy: [facility] ([facility] [place])    Allergies: Clarithromycin, PCN   Allergies   Allergen Reactions    Clarithromycin Nausea Only    Penicillins Rash     Immunizations:   - Flu UTD 2023   - Staying UTD with COVID boosters recommended  - RSV recommedned  - PCV/PPSV UTD  - unknown last Tdap  - Shingrix previously recommended     Family History:   No family history on file.    Social History:   Home/Living Situation/Falls/Safety Assessment: lives alone  Education/Employment/Work/Vocational: retired   Activities: limited physical activity   Drug Use: never smoker, rare alcohol use  Diet: T2DM diet discussion   Depression/Anxiety: denies   Sexuality/Contraception/Menstrual History: not sexually active   Sleep: STEVE concerns but defers workup     Patient Information:  Health Insurance: Medicare   Transportation: [employer] POA/Guardian: emergency contact, Niece will likely be assigned (Theresa Quiroz, [phone]); Nephew, Ralf Quan, [phone]  Contact Information: [phone]    Visit Vitals  /70   Pulse 56   Wt (!) 153 kg (337 lb)   SpO2 92%   BMI 51.24 kg/m²   Smoking Status Never   BSA 2.71 m²      PHYSICAL EXAM:   General: well appearing  male, pleasant and engaged in encounter    HEENT: NCAT, MMM, large neck circumference   CV: irregularly irregular, distant heart sounds   PULM: CTAB, non-labored respirations   ABD: soft, obese, NT, ND   : no suprapubic or CVA tenderness   EXT: WWP, no significant edema   SKIN: no rashes noted   NEURO: A&Ox4, symmetric facies, soft spoken, no significant tremor noted during exam today, no gross motor or sensory deficits, ambulation affected by morbid obesity   PSYCH: pleasant mood, appropriate affect     Assessment/Plan    Noe Quan is a 73 y.o. male [place] [facility] [place] Ramirez on 04/17/24 for routine care, as well as for management of the following chronic medical  "conditions: morbid obesity, HTN, DLD, Afib, BPH, T2DM. He presents today for CPE/MCW visit.     ACUTE CONCERNS:   #Incontinence--likely multifactorial including BPH, medication effects (alpha blocker, longstanding; diuretic), obesity, STEVE with CPAP intolerance  - predominately urge per description   - typically \"leaking\" and \"urgency\"   - not large volume  - abnormal UA in fall with repeat ordered but not pursued   [  ] repeat urine studies today  - known T2DM  [  ] updated CMP, A1C  - known BPH on longstanding alpha-blocker   [  ] urology referral for additional recommendations     NOTE: patient with gait instability concerns in the fall; concurrent R arm tremor, some positional dizziness (particularly in AM when waking); had discussed possible neurology evaluation for early movement disorder, which he deferred. Given urinary symptoms as above, consider diagnosis such as NPH, however patient notes gait instability has improved, NO recent falls, not mentation concerns, making this less likely. If ongoing concerns, however, may consider non-contrast CT head to further characterize/evaluate.   [  ] continue to monitor gait, mental status; if any ongoing concerns and with concurrent urinary complaints, may consider CT head    #R Sided Chest Pain  - intermittent, around once per week per patient   - describes as \"feeling like a pulled muscle\"   - patient profoundly deconditioned making it difficult to determine if occurring with exertion; does also occur at rest   - no recent EKG available for review (last seen in prior EMR from 2015)  [  ] repeat EKG today: overall similar morphology, T wave inversion in lead 3, bradycardic and irregular   [  ] stress echo to further characterize     CHRONIC MEDICAL CONDITIONS:   #HTN  - hydrochlorothiazide 25, Losartan 100mg daily, Metoprolol tartrate 25mg BID   - BP well controlled in office today   - RFP today with reassuring renal function   [  ] may consider dose reduction of " Metoprolol given above symptoms     #DLD  - Atorva 40  - labs well controlled on current regimen     #Afib  - B-blocker as above, Xarelto 20mg daily     #BPH, Erectile Dysfunction, Urinary Incontinence   - Terazosin 10mg daily (takes at night before bed to try and mitigate side effects)   [  ] UA with labs today   [  ] urology referral as above     #Polycythemia, IMPROVED   - seen by hematology, repeat labs reassuring, no additional workup at this time   - EPO 17, ferritin in 100s   - STEVE testing, likely etiology; patient REFUSES and does not wish to see sleep medicine at this time    #T2DM, morbid obesity   - Metformin 500mg BID   - ARB for renoprotection   - A1C 5.9% on current regimen (labs 04/2024)     #Health Maintenance    Cancer Screening  - Colorectal Cancer Screening: unclear, Cologuard ordered in 2022 and 2023 never completed, encouraged to complete  - Lung Cancer Screening: non-smoker   - Prostate Cancer Screening: reassuring 04/2023; labs today     Laboratory Screening  - Lipid Screen: on statin   - ASCVD Score: on statin at goal  - A1C, glucose screen: 5.9%  - STI, HIV, Hep B screen: labs today   - Hep C screen: labs today     Imaging Screening  - AAA screening: non-smoker     Immunizations:   - Influenza: annual recommended   - COVID: recommend staying UTD with booster  - RSV: recommended   - Tdap: unknown  - Prevnar, Pneumovax: complete   - Shingrix: recommended     Other Screening  - Health Literacy Assessment: adequate   - Depression screen: negative   - Home safety/partner violence screen: lives alone   - Hearing/Vision screens:   - Alcohol/tobacco/drug use screen: non-smoker   - Healthcare POA/Advanced Directives: niece, nephew     Referrals: labs, urology, Cologuard, med refills, EKG, Stress testing     Patient Discussion:    Please call back the office with any questions at [phone]. In the case of an emergency, please call 911 or go to the nearest Emergency Department.      Paul  MD Ashley  Internal Medicine-Pediatrics  [facility] [place] [address], [address]  P: [phone], F: [phone]

## 2024-04-18 VITALS
DIASTOLIC BLOOD PRESSURE: 70 MMHG | WEIGHT: 315 LBS | HEART RATE: 56 BPM | BODY MASS INDEX: 51.24 KG/M2 | OXYGEN SATURATION: 92 % | SYSTOLIC BLOOD PRESSURE: 124 MMHG

## 2024-04-18 LAB
ERYTHROCYTE [DISTWIDTH] IN BLOOD BY AUTOMATED COUNT: 14.2 % (ref 11.5–14.5)
EST. AVERAGE GLUCOSE BLD GHB EST-MCNC: 123 MG/DL
HBA1C MFR BLD: 5.9 %
HCT VFR BLD AUTO: 41.2 % (ref 41–52)
HGB BLD-MCNC: 15.4 G/DL (ref 13.5–17.5)
HIV 1+2 AB+HIV1 P24 AG SERPL QL IA: NONREACTIVE
MCH RBC QN AUTO: 34.1 PG (ref 26–34)
MCHC RBC AUTO-ENTMCNC: 37.4 G/DL (ref 32–36)
MCV RBC AUTO: 91 FL (ref 80–100)
NRBC BLD-RTO: 0 /100 WBCS (ref 0–0)
PLATELET # BLD AUTO: 183 X10*3/UL (ref 150–450)
RBC # BLD AUTO: 4.52 X10*6/UL (ref 4.5–5.9)
WBC # BLD AUTO: 8 X10*3/UL (ref 4.4–11.3)

## 2024-04-18 ASSESSMENT — PATIENT HEALTH QUESTIONNAIRE - PHQ9
2. FEELING DOWN, DEPRESSED OR HOPELESS: NOT AT ALL
1. LITTLE INTEREST OR PLEASURE IN DOING THINGS: NOT AT ALL
SUM OF ALL RESPONSES TO PHQ9 QUESTIONS 1 AND 2: 0

## 2024-04-18 ASSESSMENT — ACTIVITIES OF DAILY LIVING (ADL)
TAKING_MEDICATION: INDEPENDENT
BATHING: INDEPENDENT
DOING_HOUSEWORK: NEEDS ASSISTANCE
DRESSING: INDEPENDENT
MANAGING_FINANCES: INDEPENDENT
GROCERY_SHOPPING: INDEPENDENT

## 2024-04-18 ASSESSMENT — ENCOUNTER SYMPTOMS
LOSS OF SENSATION IN FEET: 0
DEPRESSION: 0
OCCASIONAL FEELINGS OF UNSTEADINESS: 1

## 2024-04-19 LAB
PSA FREE MFR SERPL: 35 %
PSA FREE SERPL-MCNC: 1.2 NG/ML
PSA SERPL IA-MCNC: 3.4 NG/ML (ref 0–4)

## 2024-05-21 ENCOUNTER — CLINICAL SUPPORT (OUTPATIENT)
Dept: OPHTHALMOLOGY | Facility: CLINIC | Age: 74
End: 2024-05-21
Payer: MEDICARE

## 2024-05-21 ENCOUNTER — APPOINTMENT (OUTPATIENT)
Dept: OPHTHALMOLOGY | Facility: CLINIC | Age: 74
End: 2024-05-21
Payer: MEDICARE

## 2024-05-21 ENCOUNTER — OFFICE VISIT (OUTPATIENT)
Dept: OPHTHALMOLOGY | Facility: CLINIC | Age: 74
End: 2024-05-21
Payer: MEDICARE

## 2024-05-21 DIAGNOSIS — H52.7 UNSPECIFIED DISORDER OF REFRACTION: ICD-10-CM

## 2024-05-21 DIAGNOSIS — H25.13 NUCLEAR SENILE CATARACT OF BOTH EYES: ICD-10-CM

## 2024-05-21 DIAGNOSIS — E11.9 CONTROLLED TYPE 2 DIABETES MELLITUS WITHOUT COMPLICATION, WITHOUT LONG-TERM CURRENT USE OF INSULIN (MULTI): Primary | ICD-10-CM

## 2024-05-21 PROCEDURE — 92015 DETERMINE REFRACTIVE STATE: CPT | Performed by: OPHTHALMOLOGY

## 2024-05-21 PROCEDURE — 99214 OFFICE O/P EST MOD 30 MIN: CPT | Performed by: OPHTHALMOLOGY

## 2024-05-21 ASSESSMENT — ENCOUNTER SYMPTOMS
NEUROLOGICAL NEGATIVE: 0
PSYCHIATRIC NEGATIVE: 0
ALLERGIC/IMMUNOLOGIC NEGATIVE: 0
HEMATOLOGIC/LYMPHATIC NEGATIVE: 0
GASTROINTESTINAL NEGATIVE: 0
CARDIOVASCULAR NEGATIVE: 0
EYES NEGATIVE: 0
ENDOCRINE NEGATIVE: 0
OCCASIONAL FEELINGS OF UNSTEADINESS: 0
MUSCULOSKELETAL NEGATIVE: 0
DEPRESSION: 0
CONSTITUTIONAL NEGATIVE: 0
LOSS OF SENSATION IN FEET: 0
RESPIRATORY NEGATIVE: 0

## 2024-05-21 ASSESSMENT — VISUAL ACUITY
CORRECTION_TYPE: GLASSES
OD_CC: 20/30
METHOD: SNELLEN - LINEAR
OS_CC: 20/30

## 2024-05-21 ASSESSMENT — TONOMETRY
OD_IOP_MMHG: 20
OS_IOP_MMHG: 20
IOP_METHOD: GOLDMANN APPLANATION

## 2024-05-21 ASSESSMENT — PATIENT HEALTH QUESTIONNAIRE - PHQ9
2. FEELING DOWN, DEPRESSED OR HOPELESS: NOT AT ALL
SUM OF ALL RESPONSES TO PHQ9 QUESTIONS 1 AND 2: 0
1. LITTLE INTEREST OR PLEASURE IN DOING THINGS: NOT AT ALL

## 2024-05-21 ASSESSMENT — SLIT LAMP EXAM - LIDS
COMMENTS: NORMAL
COMMENTS: NORMAL

## 2024-05-21 ASSESSMENT — CUP TO DISC RATIO
OD_RATIO: 0.4
OS_RATIO: 0.3

## 2024-05-21 ASSESSMENT — REFRACTION_WEARINGRX
SPECS_TYPE: SVL
OD_CYLINDER: -1.75
OD_AXIS: 001
OS_SPHERE: +2.00
OS_CYLINDER: -1.50
OS_AXIS: 008
OD_SPHERE: -1.25

## 2024-05-21 ASSESSMENT — KERATOMETRY
OD_AXISANGLE_DEGREES: 90
OS_AXISANGLE_DEGREES: 95
OD_K2POWER_DIOPTERS: 44.75
OS_K2POWER_DIOPTERS: 44.50
OD_K1POWER_DIOPTERS: 42.50
OS_AXISANGLE2_DEGREES: 5
OS_K1POWER_DIOPTERS: 43.00
OD_AXISANGLE2_DEGREES: 180

## 2024-05-21 ASSESSMENT — EXTERNAL EXAM - RIGHT EYE: OD_EXAM: NORMAL

## 2024-05-21 ASSESSMENT — PAIN SCALES - GENERAL: PAINLEVEL: 0-NO PAIN

## 2024-05-21 ASSESSMENT — EXTERNAL EXAM - LEFT EYE: OS_EXAM: NORMAL

## 2024-05-21 NOTE — PROGRESS NOTES
Assessment/Plan   Problem List Items Addressed This Visit       Controlled diabetes mellitus (Multi) - Primary     Advised no signs of diabetic changes on exam. Recommend to continue best sugar control and on need for annual checkup. Understands role of good BP control and weight loss if applicable. Discussed some components of selected studies like WESDR, DCCT, and UKPDS to describe the likely course of diabetes and effects on the eyes.           Nuclear senile cataract     Non significant cataract noted on exam. Will plan to continue to monitor with serial exam.            Unspecified disorder of refraction     Could update distance RX, doubtful would tolerate progressives or bifocal as previously uncomfortable. Advised on [employer] intermediate glasses for computer if interested.             Provided reassurance regarding above diagnoses and care received in the office visit today. Discussed outcomes and options along with the importance of treatment compliance. Understands the importance of any follow up visits. Patient instructed to call/communicate with our office if any new issues, questions, or concerns.     Will plan to see back in 1 year full or sooner PRN

## 2024-05-21 NOTE — ASSESSMENT & PLAN NOTE
Could update distance RX, doubtful would tolerate progressives or bifocal as previously uncomfortable. Advised on [employer] intermediate glasses for computer if interested.

## 2024-05-21 NOTE — PATIENT INSTRUCTIONS
Thank you so much for choosing me to provide your care today!    If you were dilated your vision may remain blurry   or light sensitive for several hours.    The nature of eye and vision problems can require frequent follow up, please make every effort to adhere to any future appointments.    If you have any issues, questions, or concerns,   please do not hesitate to reach out.    If you receive a survey in regards to your care today, please mention any exceptional care my office staff and/or technicians provided.    You can reach our office at this number:  [phone]

## 2024-05-21 NOTE — LETTER
"May 21, 2024    Paul Ramirez MD  [address]  [facility] [address]  [facility] [address]    Patient: Mikey Quan   YOB: 1950   Date of Visit: 5/21/2024       Dear Dr. Paul Ramirez MD:    Thank you for referring Mikey Quan to me for evaluation. Here is my assessment and plan of care:    Assessment/Plan:  Noe \"Mikey\" was seen today for annual exam and blurred vision.  Diagnoses and all orders for this visit:  Controlled type 2 diabetes mellitus without complication, without long-term current use of insulin (Multi) (Primary)  Nuclear senile cataract of both eyes  Unspecified disorder of refraction    Right eye:     Left eye:    [x] No diabetes mellitus (DM) retinopathy [x] No diabetes mellitus (DM) retinopathy    [] Mild non-proliferative retinopathy [] Mild non-proliferative retinopathy    [] Moderate non-proliferative retinopathy [] Moderate non-proliferative retinopathy    [] Severe non-proliferative retinopathy [] Severe non-proliferative retinopathy    [] Proliferative retinopathy   [] Proliferative retinopathy    [] Diabetic macular edema   [] Diabetic macular edema    Urged patient to continue to work on best blood sugar and blood pressure control  Advised patient to call if any new vision changes noted    Will plan to repeat evaluation in:   [] 3 months [] 6 months [] 9 months [x] 12 months [] Other    Below you can find relevant pieces of the exam. If you have questions, please do not hesitate to call me. I look forward to following Mikey along with you.         Sincerely,        Humberto Oliveira MD        CC:   No Recipients         External Exam         Right Left    External Normal Normal              Slit Lamp Exam         Right Left    Lids/Lashes Normal Normal    Conjunctiva/Sclera White and quiet White and quiet    Cornea looks dry looks dry    Anterior Chamber Deep and quiet Deep and quiet    Iris Round and reactive Round and reactive " "   Lens 1+ nuclear sclerosis 1+ nuclear sclerosis              Fundus Exam         Right Left    Vitreous Normal Normal    Disc Normal Normal    C/D Ratio 0.4 0.3    Macula Normal Normal    Vessels Normal Normal    Periphery Normal Normal                   <div id=\"[email or web address]\"></div>     "

## 2024-10-22 ENCOUNTER — LAB (OUTPATIENT)
Dept: LAB | Facility: LAB | Age: 74
End: 2024-10-22
Payer: MEDICARE

## 2024-10-22 ENCOUNTER — APPOINTMENT (OUTPATIENT)
Dept: PRIMARY CARE | Facility: CLINIC | Age: 74
End: 2024-10-22
Payer: MEDICARE

## 2024-10-22 VITALS
DIASTOLIC BLOOD PRESSURE: 73 MMHG | WEIGHT: 315 LBS | BODY MASS INDEX: 47.74 KG/M2 | OXYGEN SATURATION: 92 % | SYSTOLIC BLOOD PRESSURE: 122 MMHG | HEIGHT: 68 IN | HEART RATE: 66 BPM

## 2024-10-22 DIAGNOSIS — R32 URINARY INCONTINENCE, UNSPECIFIED TYPE: ICD-10-CM

## 2024-10-22 DIAGNOSIS — I10 BENIGN ESSENTIAL HYPERTENSION: ICD-10-CM

## 2024-10-22 DIAGNOSIS — N40.0 BENIGN PROSTATIC HYPERPLASIA, UNSPECIFIED WHETHER LOWER URINARY TRACT SYMPTOMS PRESENT: ICD-10-CM

## 2024-10-22 DIAGNOSIS — E78.5 HYPERLIPIDEMIA, UNSPECIFIED HYPERLIPIDEMIA TYPE: ICD-10-CM

## 2024-10-22 DIAGNOSIS — E11.9 CONTROLLED TYPE 2 DIABETES MELLITUS WITHOUT COMPLICATION, WITHOUT LONG-TERM CURRENT USE OF INSULIN (MULTI): ICD-10-CM

## 2024-10-22 DIAGNOSIS — R07.9 CHEST PAIN, UNSPECIFIED TYPE: ICD-10-CM

## 2024-10-22 DIAGNOSIS — Z12.11 COLON CANCER SCREENING: ICD-10-CM

## 2024-10-22 DIAGNOSIS — E11.69 TYPE 2 DIABETES MELLITUS WITH OTHER SPECIFIED COMPLICATION, WITHOUT LONG-TERM CURRENT USE OF INSULIN: ICD-10-CM

## 2024-10-22 DIAGNOSIS — Z23 IMMUNIZATION DUE: Primary | ICD-10-CM

## 2024-10-22 DIAGNOSIS — E66.01 MORBID OBESITY (MULTI): ICD-10-CM

## 2024-10-22 DIAGNOSIS — I48.91 ATRIAL FIBRILLATION, UNSPECIFIED TYPE (MULTI): ICD-10-CM

## 2024-10-22 LAB
ALBUMIN SERPL BCP-MCNC: 4.1 G/DL (ref 3.4–5)
ALP SERPL-CCNC: 123 U/L (ref 33–136)
ALT SERPL W P-5'-P-CCNC: 21 U/L (ref 10–52)
ANION GAP SERPL CALC-SCNC: 15 MMOL/L (ref 10–20)
AST SERPL W P-5'-P-CCNC: 19 U/L (ref 9–39)
BILIRUB SERPL-MCNC: 1.1 MG/DL (ref 0–1.2)
BUN SERPL-MCNC: 16 MG/DL (ref 6–23)
CALCIUM SERPL-MCNC: 9.8 MG/DL (ref 8.6–10.6)
CHLORIDE SERPL-SCNC: 100 MMOL/L (ref 98–107)
CO2 SERPL-SCNC: 30 MMOL/L (ref 21–32)
CREAT SERPL-MCNC: 1.13 MG/DL (ref 0.5–1.3)
EGFRCR SERPLBLD CKD-EPI 2021: 69 ML/MIN/1.73M*2
EST. AVERAGE GLUCOSE BLD GHB EST-MCNC: 120 MG/DL
GLUCOSE SERPL-MCNC: 89 MG/DL (ref 74–99)
HBA1C MFR BLD: 5.8 %
POTASSIUM SERPL-SCNC: 4.7 MMOL/L (ref 3.5–5.3)
PROT SERPL-MCNC: 6.9 G/DL (ref 6.4–8.2)
SODIUM SERPL-SCNC: 140 MMOL/L (ref 136–145)

## 2024-10-22 PROCEDURE — 3061F NEG MICROALBUMINURIA REV: CPT | Performed by: STUDENT IN AN ORGANIZED HEALTH CARE EDUCATION/TRAINING PROGRAM

## 2024-10-22 PROCEDURE — 1126F AMNT PAIN NOTED NONE PRSNT: CPT | Performed by: STUDENT IN AN ORGANIZED HEALTH CARE EDUCATION/TRAINING PROGRAM

## 2024-10-22 PROCEDURE — 3078F DIAST BP <80 MM HG: CPT | Performed by: STUDENT IN AN ORGANIZED HEALTH CARE EDUCATION/TRAINING PROGRAM

## 2024-10-22 PROCEDURE — 80053 COMPREHEN METABOLIC PANEL: CPT

## 2024-10-22 PROCEDURE — 36415 COLL VENOUS BLD VENIPUNCTURE: CPT

## 2024-10-22 PROCEDURE — G2211 COMPLEX E/M VISIT ADD ON: HCPCS | Performed by: STUDENT IN AN ORGANIZED HEALTH CARE EDUCATION/TRAINING PROGRAM

## 2024-10-22 PROCEDURE — 1159F MED LIST DOCD IN RCRD: CPT | Performed by: STUDENT IN AN ORGANIZED HEALTH CARE EDUCATION/TRAINING PROGRAM

## 2024-10-22 PROCEDURE — 83036 HEMOGLOBIN GLYCOSYLATED A1C: CPT

## 2024-10-22 PROCEDURE — 3044F HG A1C LEVEL LT 7.0%: CPT | Performed by: STUDENT IN AN ORGANIZED HEALTH CARE EDUCATION/TRAINING PROGRAM

## 2024-10-22 PROCEDURE — G0008 ADMIN INFLUENZA VIRUS VAC: HCPCS | Performed by: STUDENT IN AN ORGANIZED HEALTH CARE EDUCATION/TRAINING PROGRAM

## 2024-10-22 PROCEDURE — 3008F BODY MASS INDEX DOCD: CPT | Performed by: STUDENT IN AN ORGANIZED HEALTH CARE EDUCATION/TRAINING PROGRAM

## 2024-10-22 PROCEDURE — 90662 IIV NO PRSV INCREASED AG IM: CPT | Performed by: STUDENT IN AN ORGANIZED HEALTH CARE EDUCATION/TRAINING PROGRAM

## 2024-10-22 PROCEDURE — 81001 URINALYSIS AUTO W/SCOPE: CPT

## 2024-10-22 PROCEDURE — 99214 OFFICE O/P EST MOD 30 MIN: CPT | Performed by: STUDENT IN AN ORGANIZED HEALTH CARE EDUCATION/TRAINING PROGRAM

## 2024-10-22 PROCEDURE — 4010F ACE/ARB THERAPY RXD/TAKEN: CPT | Performed by: STUDENT IN AN ORGANIZED HEALTH CARE EDUCATION/TRAINING PROGRAM

## 2024-10-22 PROCEDURE — 3048F LDL-C <100 MG/DL: CPT | Performed by: STUDENT IN AN ORGANIZED HEALTH CARE EDUCATION/TRAINING PROGRAM

## 2024-10-22 PROCEDURE — 3074F SYST BP LT 130 MM HG: CPT | Performed by: STUDENT IN AN ORGANIZED HEALTH CARE EDUCATION/TRAINING PROGRAM

## 2024-10-22 RX ORDER — TIRZEPATIDE 2.5 MG/.5ML
2.5 INJECTION, SOLUTION SUBCUTANEOUS WEEKLY
Qty: 2 ML | Refills: 0 | Status: SHIPPED | OUTPATIENT
Start: 2024-10-22

## 2024-10-22 ASSESSMENT — PAIN SCALES - GENERAL: PAINLEVEL_OUTOF10: 0-NO PAIN

## 2024-10-22 ASSESSMENT — PATIENT HEALTH QUESTIONNAIRE - PHQ9
4. FEELING TIRED OR HAVING LITTLE ENERGY: NEARLY EVERY DAY
8. MOVING OR SPEAKING SO SLOWLY THAT OTHER PEOPLE COULD HAVE NOTICED. OR THE OPPOSITE, BEING SO FIGETY OR RESTLESS THAT YOU HAVE BEEN MOVING AROUND A LOT MORE THAN USUAL: NOT AT ALL
SUM OF ALL RESPONSES TO PHQ9 QUESTIONS 1 AND 2: 4
9. THOUGHTS THAT YOU WOULD BE BETTER OFF DEAD, OR OF HURTING YOURSELF: NOT AT ALL
10. IF YOU CHECKED OFF ANY PROBLEMS, HOW DIFFICULT HAVE THESE PROBLEMS MADE IT FOR YOU TO DO YOUR WORK, TAKE CARE OF THINGS AT HOME, OR GET ALONG WITH OTHER PEOPLE: EXTREMELY DIFFICULT
7. TROUBLE CONCENTRATING ON THINGS, SUCH AS READING THE NEWSPAPER OR WATCHING TELEVISION: NEARLY EVERY DAY
5. POOR APPETITE OR OVEREATING: NEARLY EVERY DAY
1. LITTLE INTEREST OR PLEASURE IN DOING THINGS: NEARLY EVERY DAY
2. FEELING DOWN, DEPRESSED OR HOPELESS: SEVERAL DAYS
3. TROUBLE FALLING OR STAYING ASLEEP OR SLEEPING TOO MUCH: MORE THAN HALF THE DAYS

## 2024-10-22 NOTE — PATIENT INSTRUCTIONS
Labs in [address]    Flu shot today   COVID, RSV, and Shingles vaccines through pharmacy     Pharmacy referral  They will reach out to you   Working to get Tirzepatide (Mounjaro) covered     Urology and Stress Testing follow up advised  Call [phone] to schedule    Cologuard re-ordered  Please complete to get up to date on colon cancer screening    Follow up with me in 3 months!    Best,  Dr. SHEIKH

## 2024-10-22 NOTE — PROGRESS NOTES
"Noe Quan is a 74 y.o. male seen in Clinic at [facility] by Dr. Paul Ramirez on 10/22/24 for routine care, as well as for management of the following chronic medical conditions: morbid obesity, HTN, DLD, Afib, BPH, T2DM. He presents today for follow up visit.     ACUTE CONCERNS:   #Incontinence--likely multifactorial including BPH, medication effects (alpha blocker, longstanding; diuretic), obesity, STEVE with CPAP intolerance  - predominately urge per description   - typically \"leaking\" and \"urgency\"   - not large volume  - prior abnormal UA    - known T2DM  [x] updated CMP, A1C: 5.8%, stable lytes/glucose/renal function   - known BPH on longstanding alpha-blocker   [  ] urology referral for additional recommendations--patient did not pursue; advise follow up     NOTE: patient with gait instability concerns in fall 2023; concurrent R arm tremor, some positional dizziness (particularly in AM when waking); had discussed possible neurology evaluation for early movement disorder, which he deferred. Given urinary symptoms as above, consider diagnosis such as NPH, however no progression of gait abnormalities, NO recent falls, no mentation concerns, making this less likely. If ongoing concerns, however, may consider non-contrast CT head to further characterize/evaluate.   [  ] continue to monitor gait, mental status; if any ongoing concerns and with concurrent urinary complaints, may consider CT head    #R Sided Chest Pain  - intermittent, around once per week per patient   - describes as \"feeling like a pulled muscle\"   - patient profoundly deconditioned making it difficult to determine if occurring with exertion; does also occur at rest   - no recent EKG available for review (last seen in prior EMR from 2015)  [x] repeat EKG at last visit: overall similar morphology, T wave inversion in lead 3, bradycardic and irregular   [  ] stress echo to further characterize: patient has not pursued; again encouraged to follow " up     CHRONIC MEDICAL CONDITIONS:   #HTN  - hydrochlorothiazide 25, Losartan 100mg daily, Metoprolol tartrate 25mg BID   - BP well controlled in office today   - RFP today with reassuring renal function: Cr 1.13    #DLD  - Atorva 40  - labs well controlled on current regimen (LDL 27 per labs 04/2024)     #Afib  - B-blocker as above, Xarelto 20mg daily     #BPH, Erectile Dysfunction, Urinary Incontinence   - Terazosin 10mg daily (takes at night before bed to try and mitigate side effects)   [  ] urology referral as above   [  ] consider renal US, bladder scan with PVR    #Polycythemia, IMPROVED   - seen by hematology, repeat labs reassuring, no additional workup at this time   - EPO 17, ferritin in 100s   - STEVE testing, likely etiology; patient REFUSES and does not wish to see sleep medicine at this time    #T2DM, morbid obesity   - Metformin 500mg BID   - ARB for renoprotection   - A1C 5.9% on current regimen (labs 04/2024)   [  ] GLP-1 trial: Tirzepatide   [  ] pharmacy referral     Past Medical History: as above   Past Medical History:   Diagnosis Date    Age-related nuclear cataract, bilateral     Diabetes mellitus (Multi)     Dry eyes     Refractive error      Subspecialty Medical Care: prior hematology     Past Surgical History:   History reviewed. No pertinent surgical history.    Medications:  Current Outpatient Medications:     atorvastatin (Lipitor) 40 mg tablet, Take 1 tablet (40 mg) by mouth once daily at bedtime., Disp: 90 tablet, Rfl: 3    hydroCHLOROthiazide (HYDRODiuril) 25 mg tablet, Take 1 tablet (25 mg) by mouth once daily., Disp: 90 tablet, Rfl: 3    losartan (Cozaar) 100 mg tablet, Take 1 tablet (100 mg) by mouth once daily., Disp: 90 tablet, Rfl: 3    metFORMIN (Glucophage) 500 mg tablet, Take 1 tablet (500 mg) by mouth 2 times a day with meals., Disp: 180 tablet, Rfl: 3    metoprolol tartrate (Lopressor) 25 mg tablet, Take 1 tablet (25 mg) by mouth 2 times a day., Disp: 180 tablet, Rfl: 3     "OneTouch Ultra Test strip, TEST BLOOD SUGAR ONCE A DAY, Disp: , Rfl:     terazosin (Hytrin) 10 mg capsule, Take 1 capsule (10 mg) by mouth once daily., Disp: 90 capsule, Rfl: 3    rivaroxaban (Xarelto) 20 mg tablet, Take 1 tablet (20 mg) by mouth once daily in the evening. Take with meals., Disp: 30 tablet, Rfl: 11    tirzepatide (Mounjaro) 2.5 mg/0.5 mL pen injector, Inject 2.5 mg under the skin 1 (one) time per week., Disp: 2 mL, Rfl: 0  Pharmacy: [facility] ([facility])    Allergies: Clarithromycin, PCN   Allergies   Allergen Reactions    Clarithromycin Nausea Only and Nausea/vomiting    Penicillins Rash     Immunizations:   - Flu given today   - Staying UTD with COVID boosters recommended  - RSV recommended   - PCV/PPSV UTD  - unknown last Tdap  - Shingrix previously recommended     Family History:   Family History   Problem Relation Name Age of Onset    Cancer Mother      Cancer Sister       Social History:   Home/Living Situation/Falls/Safety Assessment: lives alone  Education/Employment/Work/Vocational: retired   Activities: limited physical activity   Drug Use: never smoker, rare alcohol use  Diet: T2DM diet discussion   Depression/Anxiety: denies   Sexuality/Contraception/Menstrual History: not sexually active   Sleep: STEVE concerns but defers workup     Patient Information:  Health Insurance: Medicare   Transportation: [employer]   [facility] POA/Guardian: emergency contact, Niece will likely be assigned (Theresa Richie, [phone]); Nephew, Ralf Quan, [phone]  Contact Information: [phone]    Visit Vitals  /73 (BP Location: Right arm, Patient Position: Sitting, BP Cuff Size: Large adult)   Pulse 66   Ht 1.727 m (5' 8\")   Wt (!) 155 kg (342 lb)   SpO2 92%   BMI 52.00 kg/m²   Smoking Status Never   BSA 2.73 m²      PHYSICAL EXAM:   General: well appearing  male, pleasant and engaged in encounter    HEENT: NCAT, MMM, large neck circumference   CV: irregularly irregular, distant " "heart sounds   PULM: CTAB, non-labored respirations   ABD: soft, obese, NT, ND   : no suprapubic or CVA tenderness   EXT: WWP, no significant edema   SKIN: no rashes noted   NEURO: A&Ox4, symmetric facies, soft spoken, no significant tremor noted during exam today, no gross motor or sensory deficits, ambulation affected by morbid obesity   PSYCH: pleasant mood, appropriate affect     Assessment/Plan    Noe Quan is a 74 y.o. male seen in [place] [facility] [place] Paul Ramirez on 10/22/24 for routine care, as well as for management of the following chronic medical conditions: morbid obesity, HTN, DLD, Afib, BPH, T2DM. He presents today for follow up visit.     ACUTE CONCERNS:   #Incontinence--likely multifactorial including BPH, medication effects (alpha blocker, longstanding; diuretic), obesity, STEVE with CPAP intolerance  - predominately urge per description   - typically \"leaking\" and \"urgency\"   - not large volume  - prior abnormal UA    - known T2DM  [x] updated CMP, A1C: 5.8%, stable lytes/glucose/renal function   - known BPH on longstanding alpha-blocker   [  ] urology referral for additional recommendations--patient did not pursue; advise follow up     NOTE: patient with gait instability concerns in fall 2023; concurrent R arm tremor, some positional dizziness (particularly in AM when waking); had discussed possible neurology evaluation for early movement disorder, which he deferred. Given urinary symptoms as above, consider diagnosis such as NPH, however no progression of gait abnormalities, NO recent falls, no mentation concerns, making this less likely. If ongoing concerns, however, may consider non-contrast CT head to further characterize/evaluate.   [  ] continue to monitor gait, mental status; if any ongoing concerns and with concurrent urinary complaints, may consider CT head    #R Sided Chest Pain  - intermittent, around once per week per patient   - describes as \"feeling like a pulled " "muscle\"   - patient profoundly deconditioned making it difficult to determine if occurring with exertion; does also occur at rest   - no recent EKG available for review (last seen in prior EMR from 2015)  [x] repeat EKG at last visit: overall similar morphology, T wave inversion in lead 3, bradycardic and irregular   [  ] stress echo to further characterize: patient has not pursued; again encouraged to follow up     CHRONIC MEDICAL CONDITIONS:   #HTN  - hydrochlorothiazide 25, Losartan 100mg daily, Metoprolol tartrate 25mg BID   - BP well controlled in office today   - RFP today with reassuring renal function: Cr 1.13    #DLD  - Atorva 40  - labs well controlled on current regimen (LDL 27 per labs 04/2024)     #Afib  - B-blocker as above, Xarelto 20mg daily     #BPH, Erectile Dysfunction, Urinary Incontinence   - Terazosin 10mg daily (takes at night before bed to try and mitigate side effects)   [  ] urology referral as above   [  ] consider renal US, bladder scan with PVR    #Polycythemia, IMPROVED   - seen by hematology, repeat labs reassuring, no additional workup at this time   - EPO 17, ferritin in 100s   - STEVE testing, likely etiology; patient REFUSES and does not wish to see sleep medicine at this time    #T2DM, morbid obesity   - Metformin 500mg BID   - ARB for renoprotection   - A1C 5.9% on current regimen (labs 04/2024)   [  ] GLP-1 trial: Tirzepatide   [  ] pharmacy referral     #Health Maintenance    Cancer Screening  - Colorectal Cancer Screening: unclear, Cologuard ordered in 2022 and 2023 never completed, encouraged to complete and ordered again   - Lung Cancer Screening: non-smoker   - Prostate Cancer Screening: reassuring 04/2024    Laboratory Screening  - Lipid Screen: on statin   - ASCVD Score: on statin at goal  - A1C, glucose screen: 5.8%  - STI, HIV, Hep B screen: negative 2024  - Hep C screen: negative 2024     Imaging Screening  - AAA screening: non-smoker     Immunizations:   - Influenza: " annual recommended; given today   - COVID: recommend staying UTD with booster  - RSV: recommended   - Tdap: unknown  - Prevnar, Pneumovax: complete   - Shingrix: recommended     Other Screening  - Health Literacy Assessment: adequate   - Depression screen: negative   - Home safety/partner violence screen: lives alone   - Hearing/Vision screens: corrective lenses; follows with optho (T2DM)  - Alcohol/tobacco/drug use screen: non-smoker   - Healthcare POA/Advanced Directives: niece, nephew     Referrals: labs, urology again advised, Cologuard, med refills, Stress testing encouraged, flu vaccine, GLP-1 trial, pharmacy referral    Patient Discussion:    Please call back the office with any questions at [phone]. In the case of an emergency, please call [phone] or go to the nearest Emergency Department.      Paul Ramirez MD  Internal Medicine-Pediatrics  [facility] [address], [address]  P: [phone], F: [phone]

## 2024-10-23 ENCOUNTER — TELEMEDICINE (OUTPATIENT)
Dept: PHARMACY | Facility: HOSPITAL | Age: 74
End: 2024-10-23
Payer: MEDICARE

## 2024-10-23 DIAGNOSIS — I48.0 PAROXYSMAL ATRIAL FIBRILLATION (MULTI): ICD-10-CM

## 2024-10-23 DIAGNOSIS — I48.91 ATRIAL FIBRILLATION, UNSPECIFIED TYPE (MULTI): Primary | ICD-10-CM

## 2024-10-23 DIAGNOSIS — I48.91 ATRIAL FIBRILLATION, UNSPECIFIED TYPE (MULTI): ICD-10-CM

## 2024-10-23 LAB
APPEARANCE UR: ABNORMAL
BILIRUB UR STRIP.AUTO-MCNC: NEGATIVE MG/DL
COLOR UR: ABNORMAL
GLUCOSE UR STRIP.AUTO-MCNC: NORMAL MG/DL
KETONES UR STRIP.AUTO-MCNC: NEGATIVE MG/DL
LEUKOCYTE ESTERASE UR QL STRIP.AUTO: ABNORMAL
MUCOUS THREADS #/AREA URNS AUTO: ABNORMAL /LPF
NITRITE UR QL STRIP.AUTO: NEGATIVE
PH UR STRIP.AUTO: 5 [PH]
PROT UR STRIP.AUTO-MCNC: NEGATIVE MG/DL
RBC # UR STRIP.AUTO: ABNORMAL /UL
RBC #/AREA URNS AUTO: ABNORMAL /HPF
SP GR UR STRIP.AUTO: 1.02
SQUAMOUS #/AREA URNS AUTO: ABNORMAL /HPF
UROBILINOGEN UR STRIP.AUTO-MCNC: NORMAL MG/DL
WBC #/AREA URNS AUTO: ABNORMAL /HPF

## 2024-10-23 NOTE — PROGRESS NOTES
"      Patient ID: Noe Quan \"Mikey\" is a 74 y.o. male who presents for urgent recommendations for Xarelto for his afib.      Subjective   Urgent Chief Complaint:   Patient scheduled for formal appointment on 10/29, however he only has 1 tablet of Xarelto left.    Objective     There were no vitals taken for this visit.   BP Readings from Last 4 Encounters:   10/22/24 122/73   04/17/24 124/70   10/31/23 118/56   04/26/23 129/71      There were no vitals filed for this visit.     Current Outpatient Medications   Medication Instructions    atorvastatin (LIPITOR) 40 mg, oral, Nightly    hydroCHLOROthiazide (HYDRODIURIL) 25 mg, oral, Daily    losartan (COZAAR) 100 mg, oral, Daily    metFORMIN (GLUCOPHAGE) 500 mg, oral, 2 times daily (morning and late afternoon)    metoprolol tartrate (LOPRESSOR) 25 mg, oral, 2 times daily    Mounjaro 2.5 mg, subcutaneous, Weekly    OneTouch Ultra Test strip TEST BLOOD SUGAR ONCE A DAY    rivaroxaban (XARELTO) 20 mg, oral, Daily    terazosin (HYTRIN) 10 mg, oral, Daily         Assessment/Plan   Spoke with patient, can apply a free month voucher card at local pharmacy [facility] for Xarelto 20mg to hold him over until his appointment on 10/29. Sent prescription with voucher information and my number for pharmacy to call if questions.  Patient knows to keep appointment on 10/29 for further instruction.    Follow-up: 10/29 @1500 with Ellen Wong, PharmD, Clinical Support Pharmacist    Continue all meds under the continuation of care with the referring provider and clinical pharmacy team.   "

## 2024-10-29 ENCOUNTER — APPOINTMENT (OUTPATIENT)
Dept: PHARMACY | Facility: HOSPITAL | Age: 74
End: 2024-10-29
Payer: MEDICARE

## 2024-10-29 DIAGNOSIS — E11.69 TYPE 2 DIABETES MELLITUS WITH OTHER SPECIFIED COMPLICATION, WITHOUT LONG-TERM CURRENT USE OF INSULIN: ICD-10-CM

## 2024-10-29 DIAGNOSIS — I48.0 PAROXYSMAL ATRIAL FIBRILLATION (MULTI): ICD-10-CM

## 2024-11-08 DIAGNOSIS — I48.0 PAROXYSMAL ATRIAL FIBRILLATION (MULTI): ICD-10-CM

## 2024-11-08 DIAGNOSIS — E11.69 TYPE 2 DIABETES MELLITUS WITH OTHER SPECIFIED COMPLICATION, WITHOUT LONG-TERM CURRENT USE OF INSULIN: ICD-10-CM

## 2024-11-08 RX ORDER — TIRZEPATIDE 2.5 MG/.5ML
2.5 INJECTION, SOLUTION SUBCUTANEOUS WEEKLY
Qty: 2 ML | Refills: 0 | Status: SHIPPED | OUTPATIENT
Start: 2024-11-08

## 2024-11-08 NOTE — TELEPHONE ENCOUNTER
Patient well established on Xarelto, no adverse effects. Previously receiving from  PAP.     #Afib  - B-blocker as above, Xarelto 20mg daily     Per PCP:   #T2DM, morbid obesity   - Metformin 500mg BID   - ARB for renoprotection   - A1C 5.9% on current regimen (labs 04/2024)   [  ] GLP-1 trial: Tirzepatide - has not yet started due to cost concerns    Mounjaro Education:     Counseled patient on Mounjaro MOA, expectations, side effects, duration of therapy, administration, and monitoring parameters.  Provided detailed dosing and administration counseling to ensure proper technique.   Reviewed Mounjaro titration schedule, starting with 2.5 mg once weekly to a goal of 15 mg once weekly if tolerated  Counseled patient on the benefits of GLP-1ra glycemic control and weight loss  Reviewed storage requirements of Mounjaro when not in use, and when to administer the medication if a dose is missed.  Advised patient that they may experience improved satiety after meals and portion sizes of meals may be reduced as doses of Mounjaro increase.       Patient Assistance Screening (VAF)  Patient verbally reports monthly or yearly income which is less than 400% federal poverty level  Application for program has been submitted for the following medications:   Mounjaro  Xarelto  Patient aware this process may take up to 2 weeks once income documents have been sent to the team.  If approved, medication must be filled through [facility] and may be picked up or mailed to patient.   If approved, medication will be billed through insurance, and patient assistance team will pay the copay. This will result in a $0 copay for the patient.  Counseled patient on mechanism of action, side effects, contraindications, and what to do if the patient misses a dose. All patients questions were answered.     Follow-up- 2 weeks    Ellen CortezD [facility]  Clinical Pharmacy Specialist, Primary Care

## 2024-11-11 PROCEDURE — RXMED WILLOW AMBULATORY MEDICATION CHARGE

## 2024-11-12 ENCOUNTER — PHARMACY VISIT (OUTPATIENT)
Dept: PHARMACY | Facility: CLINIC | Age: 74
End: 2024-11-12
Payer: MEDICARE

## 2024-11-13 ENCOUNTER — PHARMACY VISIT (OUTPATIENT)
Dept: PHARMACY | Facility: CLINIC | Age: 74
End: 2024-11-13

## 2024-11-22 ENCOUNTER — APPOINTMENT (OUTPATIENT)
Dept: PHARMACY | Facility: HOSPITAL | Age: 74
End: 2024-11-22
Payer: MEDICARE

## 2024-11-22 DIAGNOSIS — E11.69 TYPE 2 DIABETES MELLITUS WITH OTHER SPECIFIED COMPLICATION, WITHOUT LONG-TERM CURRENT USE OF INSULIN: ICD-10-CM

## 2024-11-22 DIAGNOSIS — I48.0 PAROXYSMAL ATRIAL FIBRILLATION (MULTI): ICD-10-CM

## 2024-11-22 NOTE — ASSESSMENT & PLAN NOTE
Some constipation with start of Mounjaro, patient does take Metamucil daily in the morning. Encouraged to increase hydration, currently drinks 32 oz daily.     CONTINUE   Mounjaro 2.5 mg/0.5 mL - once weekly (Saturday)   Metformin 500 mg - 1 tablet twice daily with meals     Future Considerations: D/C Metformin if on higher Mounjaro dose

## 2024-11-22 NOTE — PROGRESS NOTES
"  Clinical Pharmacy Appointment  Subjective     Patient ID: Noe Quan \"Arabella" is a 74 y.o. male who presents for Diabetes.    Referring Provider: Paul Ramirez MD      Patient Assistance for Mounjaro/Xarelto approved through 11/11/25. Will have to be renewed prior to that date to prevent lapse in coverage. Medication(s) will be received at no cost to patient from [facility].      Diabetes  He presents for his initial diabetic visit. He has type 2 diabetes mellitus. His disease course has been stable.       Diet: Hydration: 32 oz daily - Example foods-Chicken, Ham, Banana, Orange, Apple, Lettuce, Tomato, Fish     Exercise: None - states back/knee pain too severe to engage in walks of any distance or length or standing for too long. Discussed chair exercises/ water aerobics- patient is not interested at this time    Allergies   Allergen Reactions    Clarithromycin Nausea Only and Nausea/vomiting    Penicillins Rash       Objective     Current DM Pharmacotherapy:   Mounjaro 2.5 mg/0.5 mL - once weekly (Saturday)   Metformin 500 mg - 1 tablet twice daily with meals     Clarifications to above regimen: Has taken 1 dose of Mounjaro   Adverse Effects: bout of constipation, lack of appetite     SECONDARY PREVENTION  - Statin? Yes, Atorvastatin 40 mg - LDL 27 (4/17/24)   - ACEi/ARB? Yes, Losartan 100 mg  - Aspirin? No    Current monitoring regimen:   Patient is using: glucometer    Testing frequency: Does not currently test     Any episodes of hypoglycemia? No.  Did patient treat episode of hypoglycemia appropriately? N/A    Pertinent PMH Review:  - PMH of Pancreatitis: No  - PMH/FH of Medullary Thyroid Cancer: No  - PMH/FH of Multiple Endocrine Neoplasia (MEN) Type II: No  - PMH of Retinopathy: No  - PMH of Urinary Tract Infections/Yeast Infections: No    Lab Review  BMP  Lab Results   Component Value Date    CALCIUM 9.8 10/22/2024     10/22/2024    K 4.7 10/22/2024    CO2 30 10/22/2024    "  10/22/2024    BUN 16 10/22/2024    CREATININE 1.13 10/22/2024    EGFR 69 10/22/2024     LFTs  Lab Results   Component Value Date    ALT 21 10/22/2024    AST 19 10/22/2024    ALKPHOS 123 10/22/2024    BILITOT 1.1 10/22/2024     FLP  Lab Results   Component Value Date    TRIG 103 04/17/2024    CHOL 88 04/17/2024    LDLF 32 04/26/2023    LDLCALC 27 04/17/2024    HDL 40.6 04/17/2024       The ASCVD Risk score (Adrien ESTRELLA, et al., 2019) failed to calculate for the following reasons:    The valid total cholesterol range is 130 to 320 mg/dL  Urine Microalbumin  Lab Results   Component Value Date    MICROALBCREA 7.2 04/17/2024     Weight Management  Wt Readings from Last 3 Encounters:   10/22/24 (!) 155 kg (342 lb)   04/17/24 (!) 153 kg (337 lb)   04/26/23 (!) 152 kg (334 lb)      There is no height or weight on file to calculate BMI.   A1C  Lab Results   Component Value Date    HGBA1C 5.8 (H) 10/22/2024    HGBA1C 5.9 (H) 04/17/2024    HGBA1C 5.9 (H) 10/31/2023         Assessment/Plan     Problem List Items Addressed This Visit       Atrial fibrillation (Multi)    Type 2 diabetes mellitus, without long-term current use of insulin (Multi)     Some constipation with start of Mounjaro, patient does take Metamucil daily in the morning. Encouraged to increase hydration, currently drinks 32 oz daily.     CONTINUE   Mounjaro 2.5 mg/0.5 mL - once weekly (Saturday)   Metformin 500 mg - 1 tablet twice daily with meals     Future Considerations: D/C Metformin if on higher Mounjaro dose            Type 2 diabetes mellitus, is at goal. Goal A1C: <7%    Follow up: I recommend diabetes care be 2 weeks.    Ellen Fernández PharmD [facility]  Clinical Pharmacy Specialist, Primary Care     Continue all meds under the continuation of care with the referring provider and clinical pharmacy team

## 2024-12-05 DIAGNOSIS — E11.69 TYPE 2 DIABETES MELLITUS WITH OTHER SPECIFIED COMPLICATION, WITHOUT LONG-TERM CURRENT USE OF INSULIN: ICD-10-CM

## 2024-12-05 PROCEDURE — RXMED WILLOW AMBULATORY MEDICATION CHARGE

## 2024-12-05 RX ORDER — TIRZEPATIDE 2.5 MG/.5ML
2.5 INJECTION, SOLUTION SUBCUTANEOUS WEEKLY
Qty: 2 ML | Refills: 0 | OUTPATIENT
Start: 2024-12-05

## 2024-12-06 ENCOUNTER — APPOINTMENT (OUTPATIENT)
Dept: PHARMACY | Facility: HOSPITAL | Age: 74
End: 2024-12-06
Payer: MEDICARE

## 2024-12-06 DIAGNOSIS — E11.69 TYPE 2 DIABETES MELLITUS WITH OTHER SPECIFIED COMPLICATION, WITHOUT LONG-TERM CURRENT USE OF INSULIN: ICD-10-CM

## 2024-12-06 PROCEDURE — RXMED WILLOW AMBULATORY MEDICATION CHARGE

## 2024-12-06 RX ORDER — TIRZEPATIDE 5 MG/.5ML
5 INJECTION, SOLUTION SUBCUTANEOUS
Qty: 2 ML | Refills: 0 | Status: SHIPPED | OUTPATIENT
Start: 2024-12-08

## 2024-12-06 NOTE — ASSESSMENT & PLAN NOTE
Goal to lose weight and maintain DM control. Some GI BREANN, mainly bloating. Advised  to keep food journal to see if GI symptoms relate to specific foods.     INCREASE to Mounjaro 5 mg/0.5 mL - once weekly     STOP Metformin

## 2024-12-06 NOTE — PROGRESS NOTES
"  Clinical Pharmacy Appointment  Subjective     Patient ID: Noe Quan \"Arabella" is a 74 y.o. male who presents for Diabetes.    Referring Provider: Paul Ramirez MD      Patient Assistance for Mounjaro/Xarelto approved through 11/11/25. Will have to be renewed prior to that date to prevent lapse in coverage. Medication(s) will be received at no cost to patient from [facility].      Diabetes  He presents for his initial diabetic visit. He has type 2 diabetes mellitus. His disease course has been stable.       Diet: Hydration: 32 oz daily - Example foods-Chicken, Ham, Banana, Orange, Apple, Lettuce, Tomato, Fish     Bk- wheat toast with butter (4 slices) and OJ     Exercise: None - states back/knee pain too severe to engage in walks of any distance or length or standing for too long. Discussed chair exercises/ water aerobics- patient is not interested at this time    Allergies   Allergen Reactions    Clarithromycin Nausea Only and Nausea/vomiting    Penicillins Rash       Objective     Current DM Pharmacotherapy:   Mounjaro 2.5 mg/0.5 mL - once weekly (Friday)   Metformin 500 mg - 1 tablet twice daily with meals     Clarifications to above regimen: Has taken 3 doses of Mounjaro  Adverse Effects: diarrhea one day, overall feels bloated, limited appetite.     SECONDARY PREVENTION  - Statin? Yes, Atorvastatin 40 mg - LDL 27 (4/17/24)   - ACEi/ARB? Yes, Losartan 100 mg  - Aspirin? No    Current monitoring regimen:   Patient is using: glucometer    Testing frequency: Does not currently test     Any episodes of hypoglycemia? No.  Did patient treat episode of hypoglycemia appropriately? N/A    Pertinent PMH Review:  - PMH of Pancreatitis: No  - PMH/FH of Medullary Thyroid Cancer: No  - PMH/FH of Multiple Endocrine Neoplasia (MEN) Type II: No  - PMH of Retinopathy: No  - PMH of Urinary Tract Infections/Yeast Infections: No    Lab Review  BMP  Lab Results   Component Value Date    CALCIUM 9.8 " 10/22/2024     10/22/2024    K 4.7 10/22/2024    CO2 30 10/22/2024     10/22/2024    BUN 16 10/22/2024    CREATININE 1.13 10/22/2024    EGFR 69 10/22/2024     LFTs  Lab Results   Component Value Date    ALT 21 10/22/2024    AST 19 10/22/2024    ALKPHOS 123 10/22/2024    BILITOT 1.1 10/22/2024     FLP  Lab Results   Component Value Date    TRIG 103 04/17/2024    CHOL 88 04/17/2024    LDLF 32 04/26/2023    LDLCALC 27 04/17/2024    HDL 40.6 04/17/2024       The ASCVD Risk score (Adrien ESTRELLA, et al., 2019) failed to calculate for the following reasons:    The valid total cholesterol range is 130 to 320 mg/dL  Urine Microalbumin  Lab Results   Component Value Date    MICROALBCREA 7.2 04/17/2024     Weight Management  Wt Readings from Last 3 Encounters:   10/22/24 (!) 155 kg (342 lb)   04/17/24 (!) 153 kg (337 lb)   04/26/23 (!) 152 kg (334 lb)      There is no height or weight on file to calculate BMI.   A1C  Lab Results   Component Value Date    HGBA1C 5.8 (H) 10/22/2024    HGBA1C 5.9 (H) 04/17/2024    HGBA1C 5.9 (H) 10/31/2023         Assessment/Plan     Problem List Items Addressed This Visit       Type 2 diabetes mellitus, without long-term current use of insulin (Multi)     Goal to lose weight and maintain DM control. Some GI BREANN, mainly bloating. Advised  to keep food journal to see if GI symptoms relate to specific foods.     INCREASE to Mounjaro 5 mg/0.5 mL - once weekly     STOP Metformin               Type 2 diabetes mellitus, is at goal. Goal A1C: <7%    Follow up: I recommend diabetes care be 2 weeks.    Ellen Fernández PharmD [facility]  Clinical Pharmacy Specialist, Primary Care     Continue all meds under the continuation of care with the referring provider and clinical pharmacy team

## 2024-12-09 ENCOUNTER — PHARMACY VISIT (OUTPATIENT)
Dept: PHARMACY | Facility: CLINIC | Age: 74
End: 2024-12-09
Payer: MEDICARE

## 2024-12-10 ENCOUNTER — PHARMACY VISIT (OUTPATIENT)
Dept: PHARMACY | Facility: CLINIC | Age: 74
End: 2024-12-10
Payer: MEDICARE

## 2024-12-10 DIAGNOSIS — I48.0 PAROXYSMAL ATRIAL FIBRILLATION (MULTI): ICD-10-CM

## 2024-12-20 ENCOUNTER — APPOINTMENT (OUTPATIENT)
Dept: PHARMACY | Facility: HOSPITAL | Age: 74
End: 2024-12-20
Payer: MEDICARE

## 2024-12-20 DIAGNOSIS — E11.69 TYPE 2 DIABETES MELLITUS WITH OTHER SPECIFIED COMPLICATION, WITHOUT LONG-TERM CURRENT USE OF INSULIN: ICD-10-CM

## 2024-12-20 RX ORDER — TIRZEPATIDE 5 MG/.5ML
5 INJECTION, SOLUTION SUBCUTANEOUS
Qty: 2 ML | Refills: 0 | Status: SHIPPED | OUTPATIENT
Start: 2024-12-22

## 2024-12-20 NOTE — ASSESSMENT & PLAN NOTE
Goal to lose weight and maintain DM control. Some GI BREANN, reports took 1st dose of 5 mg on Friday and on Wednesday had severe diarrhea followed by significant constipation and dry stools. He then had bright red blood in stool and when wiping. Advised to monitor and increase metamucil to twice daily.     CONTINUE   Mounjaro 5 mg/0.5 mL - once weekly (Friday)

## 2024-12-20 NOTE — PROGRESS NOTES
"  Clinical Pharmacy Appointment  Subjective     Patient ID: Noe Quan \"Arabella" is a 74 y.o. male who presents for Diabetes.    Referring Provider: Paul Ramirez MD      Patient Assistance for Mounjaro/Xarelto approved through 11/11/25. Will have to be renewed prior to that date to prevent lapse in coverage. Medication(s) will be received at no cost to patient from [facility].      Diabetes  He presents for his follow-up diabetic visit. He has type 2 diabetes mellitus. His disease course has been stable.       Diet: Hydration: 32 oz daily - Example foods-Chicken, Ham, Banana, Orange, Apple, Lettuce, Tomato, Fish     Bk- wheat toast with butter (4 slices) and OJ     Exercise: None - states back/knee pain too severe to engage in walks of any distance or length or standing for too long. Discussed chair exercises/ water aerobics- patient is not interested at this time    Allergies   Allergen Reactions    Clarithromycin Nausea Only and Nausea/vomiting    Penicillins Rash       Objective     Current DM Pharmacotherapy:   Mounjaro 5 mg/0.5 mL - once weekly (Friday)     Clarifications to above regimen: Has taken 2 doses of Mounjaro  Adverse Effects: diarrhea one day, overall feels bloated, limited appetite.     SECONDARY PREVENTION  - Statin? Yes, Atorvastatin 40 mg - LDL 27 (4/17/24)   - ACEi/ARB? Yes, Losartan 100 mg  - Aspirin? No    Current monitoring regimen:   Patient is using: glucometer    Testing frequency: Does not currently test     Any episodes of hypoglycemia? No.  Did patient treat episode of hypoglycemia appropriately? N/A    Pertinent PMH Review:  - PMH of Pancreatitis: No  - PMH/FH of Medullary Thyroid Cancer: No  - PMH/FH of Multiple Endocrine Neoplasia (MEN) Type II: No  - PMH of Retinopathy: No  - PMH of Urinary Tract Infections/Yeast Infections: No    Lab Review  BMP  Lab Results   Component Value Date    CALCIUM 9.8 10/22/2024     10/22/2024    K 4.7 10/22/2024    CO2 30 " 10/22/2024     10/22/2024    BUN 16 10/22/2024    CREATININE 1.13 10/22/2024    EGFR 69 10/22/2024     LFTs  Lab Results   Component Value Date    ALT 21 10/22/2024    AST 19 10/22/2024    ALKPHOS 123 10/22/2024    BILITOT 1.1 10/22/2024     FLP  Lab Results   Component Value Date    TRIG 103 04/17/2024    CHOL 88 04/17/2024    LDLF 32 04/26/2023    LDLCALC 27 04/17/2024    HDL 40.6 04/17/2024       The ASCVD Risk score (Adrien ESTRELLA, et al., 2019) failed to calculate for the following reasons:    The valid total cholesterol range is 130 to 320 mg/dL  Urine Microalbumin  Lab Results   Component Value Date    MICROALBCREA 7.2 04/17/2024     Weight Management  Wt Readings from Last 3 Encounters:   10/22/24 (!) 155 kg (342 lb)   04/17/24 (!) 153 kg (337 lb)   04/26/23 (!) 152 kg (334 lb)      There is no height or weight on file to calculate BMI.   A1C  Lab Results   Component Value Date    HGBA1C 5.8 (H) 10/22/2024    HGBA1C 5.9 (H) 04/17/2024    HGBA1C 5.9 (H) 10/31/2023         Assessment/Plan     Problem List Items Addressed This Visit       Type 2 diabetes mellitus, without long-term current use of insulin (Multi)     Goal to lose weight and maintain DM control. Some GI BREANN, reports took 1st dose of 5 mg on Friday and on Wednesday had severe diarrhea followed by significant constipation and dry stools. He then had bright red blood in stool and when wiping. Advised to monitor and increase metamucil to twice daily.     CONTINUE   Mounjaro 5 mg/0.5 mL - once weekly (Friday)                 Type 2 diabetes mellitus, is at goal. Goal A1C: <7%    Follow up: I recommend diabetes care be 2 weeks.    Ellen Fernández PharmD [facility]  Clinical Pharmacy Specialist, Primary Care     Continue all meds under the continuation of care with the referring provider and clinical pharmacy team

## 2024-12-26 ENCOUNTER — TELEPHONE (OUTPATIENT)
Dept: PHARMACY | Facility: HOSPITAL | Age: 74
End: 2024-12-26

## 2024-12-26 NOTE — TELEPHONE ENCOUNTER
Patient contacted clinical pharmacy due to side effects of mounjaro. Pt was started on mounjaro 5 mg and taken off of metformin. Today he reported spontaneous diarrhea event that occurs 2-3 days after his injection. He reports only one loose stool movement but it occurs rapidly and he feels he doesn't have control. He was still only taking metamucil once daily, advised to start taking BID. Also advised to get loperamide (4mg on initial event, 2 mg per event, max of 16 mg and 2 days). Pt aware he can call clinical pharmacy if diarrhea worsens or feels less in control. Pt is ok with continuing moujaro 5mg, understands we may need to decrease to 2.5mg to let body adjust.     Qiana CortezD [facility]  Clinical Pharmacy Resident

## 2025-01-07 DIAGNOSIS — I48.0 PAROXYSMAL ATRIAL FIBRILLATION (MULTI): ICD-10-CM

## 2025-01-07 DIAGNOSIS — E11.69 TYPE 2 DIABETES MELLITUS WITH OTHER SPECIFIED COMPLICATION, WITHOUT LONG-TERM CURRENT USE OF INSULIN: ICD-10-CM

## 2025-01-07 PROCEDURE — RXMED WILLOW AMBULATORY MEDICATION CHARGE

## 2025-01-07 RX ORDER — TIRZEPATIDE 5 MG/.5ML
5 INJECTION, SOLUTION SUBCUTANEOUS
Qty: 2 ML | Refills: 2 | Status: SHIPPED | OUTPATIENT
Start: 2025-01-07

## 2025-01-07 NOTE — TELEPHONE ENCOUNTER
Rxs need resent to [facility] for  PAP Program.      Patient Assistance for Xarelto/Mounjaro approved through 11/11/25. Will have to be renewed prior to that date to prevent lapse in coverage. Medication(s) will be received at no cost to patient from [facility].    Please see previous notes for assessment and counseling.     Ellen Fernández PharmD, BCACP  Clinical Pharmacy Specialist, Primary Care

## 2025-01-09 ENCOUNTER — PHARMACY VISIT (OUTPATIENT)
Dept: PHARMACY | Facility: CLINIC | Age: 75
End: 2025-01-09
Payer: MEDICARE

## 2025-01-17 ENCOUNTER — APPOINTMENT (OUTPATIENT)
Dept: PHARMACY | Facility: HOSPITAL | Age: 75
End: 2025-01-17
Payer: MEDICARE

## 2025-01-17 DIAGNOSIS — E11.69 TYPE 2 DIABETES MELLITUS WITH OTHER SPECIFIED COMPLICATION, WITHOUT LONG-TERM CURRENT USE OF INSULIN: ICD-10-CM

## 2025-01-17 NOTE — PROGRESS NOTES
"  Clinical Pharmacy Appointment  Subjective     Patient ID: Noe Quan \"Arabella" is a 74 y.o. male who presents for Weight Loss.    Referring Provider: Paul Ramirez MD      Patient Assistance for Mounjaro/Xarelto approved through 11/11/25. Will have to be renewed prior to that date to prevent lapse in coverage. Medication(s) will be received at no cost to patient from [facility].      Diabetes  He presents for his follow-up diabetic visit. He has type 2 diabetes mellitus. His disease course has been stable.       Diet: Hydration: 24 oz daily - Example foods-Chicken, Ham, Banana, Orange, Apple, Lettuce, Tomato, Fish     Eating less overall     Exercise: None - states back/knee pain too severe to engage in walks of any distance or length or standing for too long. Discussed chair exercises/ water aerobics- patient is not interested at this time    Allergies   Allergen Reactions    Clarithromycin Nausea Only and Nausea/vomiting    Penicillins Rash       Objective     Current DM Pharmacotherapy:   Mounjaro 5 mg/0.5 mL - once weekly (Friday)     Clarifications to above regimen: 6 doses on Mounjaro 5 mg  Adverse Effects: None    SECONDARY PREVENTION  - Statin? Yes, Atorvastatin 40 mg - LDL 27 (4/17/24)   - ACEi/ARB? Yes, Losartan 100 mg  - Aspirin? No    Current monitoring regimen:   Patient is using: glucometer    Testing frequency: Does not currently test     Any episodes of hypoglycemia? No.  Did patient treat episode of hypoglycemia appropriately? N/A    Pertinent PMH Review:  - PMH of Pancreatitis: No  - PMH/FH of Medullary Thyroid Cancer: No  - PMH/FH of Multiple Endocrine Neoplasia (MEN) Type II: No  - PMH of Retinopathy: No  - PMH of Urinary Tract Infections/Yeast Infections: No    Lab Review  BMP  Lab Results   Component Value Date    CALCIUM 9.8 10/22/2024     10/22/2024    K 4.7 10/22/2024    CO2 30 10/22/2024     10/22/2024    BUN 16 10/22/2024    CREATININE 1.13 10/22/2024 "    EGFR 69 10/22/2024     LFTs  Lab Results   Component Value Date    ALT 21 10/22/2024    AST 19 10/22/2024    ALKPHOS 123 10/22/2024    BILITOT 1.1 10/22/2024     FLP  Lab Results   Component Value Date    TRIG 103 04/17/2024    CHOL 88 04/17/2024    LDLF 32 04/26/2023    LDLCALC 27 04/17/2024    HDL 40.6 04/17/2024       The ASCVD Risk score (Adrien ESTRELLA, et al., 2019) failed to calculate for the following reasons:    The valid total cholesterol range is 130 to 320 mg/dL  Urine Microalbumin  Lab Results   Component Value Date    MICROALBCREA 7.2 04/17/2024     Weight Management  Wt Readings from Last 3 Encounters:   10/22/24 (!) 155 kg (342 lb)   04/17/24 (!) 153 kg (337 lb)   04/26/23 (!) 152 kg (334 lb)      There is no height or weight on file to calculate BMI.   A1C  Lab Results   Component Value Date    HGBA1C 5.8 (H) 10/22/2024    HGBA1C 5.9 (H) 04/17/2024    HGBA1C 5.9 (H) 10/31/2023         Assessment/Plan     Problem List Items Addressed This Visit       Type 2 diabetes mellitus, without long-term current use of insulin (Multi)     Patient's goal A1c is < 7%.  Is pt at goal? Yes, 5.8%  Patient's SMBGs are N/A.     Rationale for plan: Patient goal to lose weight-no further ADEs. Patient prefers 1 more month of current dose    Medication Changes:  CONTINUE  Mounjaro 5 mg/0.5 mL - once weekly (Friday)     Future Considerations:  Titration as tolerated                 Follow up: I recommend diabetes care be 4 weeks.    Ellen Fernández PharmD, BCACP  Clinical Pharmacy Specialist, Primary Care       Continue all meds under the continuation of care with the referring provider and clinical pharmacy team

## 2025-01-17 NOTE — ASSESSMENT & PLAN NOTE
Patient's goal A1c is < 7%.  Is pt at goal? Yes, 5.8%  Patient's SMBGs are N/A.     Rationale for plan: Patient goal to lose weight-no further ADEs. Patient prefers 1 more month of current dose    Medication Changes:  CONTINUE  Mounjaro 5 mg/0.5 mL - once weekly (Friday)     Future Considerations:  Titration as tolerated

## 2025-01-23 ENCOUNTER — TELEPHONE (OUTPATIENT)
Dept: PRIMARY CARE | Facility: CLINIC | Age: 75
End: 2025-01-23

## 2025-01-23 DIAGNOSIS — R31.9 HEMATURIA, UNSPECIFIED TYPE: Primary | ICD-10-CM

## 2025-01-23 NOTE — TELEPHONE ENCOUNTER
Pt needs for you to give him a call in regards to Mounjaro.  Pt states that he has urinating more frequently and after last injection he saw blood in his urine.

## 2025-01-23 NOTE — PROGRESS NOTES
Patient with hematuria, concern for incomplete voiding of urine since last Friday.   Notes it was day of Mounjaro dosing, though unclear if related  Did have some diarrhea with Mounjaro  Continues to have urinary frequency but no flank pain   Intermittent hematuria persists  Is on DOAC (Xarelto)   Prior incontinence issues likely multifactorial (BPH, alpha blocker -- on Terazosin), obesity, STEVE with CPAP intolerance  Have previously advised urology evaluation, which he has not pursued    [  ] CT A/P without contrast and bladder scan with PVR to assess for retention, stone, etc  [  ] UA and culture; no fevers/chills/flank pain at present  [  ] serum renal function, CBCd     Patient aware of plan of care; will be in touch with results and next steps pending this initial workup. OK to hold next dose Mounjaro (due tomorrow) while awaiting this testing.     Paul Ramirez MD

## 2025-01-24 ENCOUNTER — HOSPITAL ENCOUNTER (OUTPATIENT)
Dept: RADIOLOGY | Facility: CLINIC | Age: 75
Discharge: HOME | End: 2025-01-24
Payer: MEDICARE

## 2025-01-24 ENCOUNTER — LAB (OUTPATIENT)
Dept: LAB | Facility: LAB | Age: 75
End: 2025-01-24
Payer: MEDICARE

## 2025-01-24 DIAGNOSIS — R31.9 HEMATURIA, UNSPECIFIED TYPE: ICD-10-CM

## 2025-01-24 DIAGNOSIS — R31.9 URINARY TRACT INFECTION WITH HEMATURIA, SITE UNSPECIFIED: Primary | ICD-10-CM

## 2025-01-24 DIAGNOSIS — N39.0 URINARY TRACT INFECTION WITH HEMATURIA, SITE UNSPECIFIED: Primary | ICD-10-CM

## 2025-01-24 PROCEDURE — 80069 RENAL FUNCTION PANEL: CPT

## 2025-01-24 PROCEDURE — 87086 URINE CULTURE/COLONY COUNT: CPT

## 2025-01-24 PROCEDURE — 81001 URINALYSIS AUTO W/SCOPE: CPT

## 2025-01-24 PROCEDURE — 85025 COMPLETE CBC W/AUTO DIFF WBC: CPT

## 2025-01-24 PROCEDURE — 87186 SC STD MICRODIL/AGAR DIL: CPT

## 2025-01-24 PROCEDURE — 74176 CT ABD & PELVIS W/O CONTRAST: CPT

## 2025-01-24 PROCEDURE — 76857 US EXAM PELVIC LIMITED: CPT

## 2025-01-25 DIAGNOSIS — R31.9 HEMATURIA, UNSPECIFIED TYPE: Primary | ICD-10-CM

## 2025-01-25 LAB
ALBUMIN SERPL BCP-MCNC: 3.9 G/DL (ref 3.4–5)
ANION GAP SERPL CALC-SCNC: 16 MMOL/L (ref 10–20)
APPEARANCE UR: ABNORMAL
BASOPHILS # BLD AUTO: 0.05 X10*3/UL (ref 0–0.1)
BASOPHILS NFR BLD AUTO: 0.4 %
BILIRUB UR STRIP.AUTO-MCNC: NEGATIVE MG/DL
BUN SERPL-MCNC: 24 MG/DL (ref 6–23)
CALCIUM SERPL-MCNC: 10.1 MG/DL (ref 8.6–10.6)
CHLORIDE SERPL-SCNC: 102 MMOL/L (ref 98–107)
CO2 SERPL-SCNC: 27 MMOL/L (ref 21–32)
COLOR UR: ABNORMAL
CREAT SERPL-MCNC: 1.23 MG/DL (ref 0.5–1.3)
EGFRCR SERPLBLD CKD-EPI 2021: 62 ML/MIN/1.73M*2
EOSINOPHIL # BLD AUTO: 0.02 X10*3/UL (ref 0–0.4)
EOSINOPHIL NFR BLD AUTO: 0.2 %
ERYTHROCYTE [DISTWIDTH] IN BLOOD BY AUTOMATED COUNT: 13.2 % (ref 11.5–14.5)
GLUCOSE SERPL-MCNC: 84 MG/DL (ref 74–99)
GLUCOSE UR STRIP.AUTO-MCNC: NORMAL MG/DL
HCT VFR BLD AUTO: 51.1 % (ref 41–52)
HGB BLD-MCNC: 16.2 G/DL (ref 13.5–17.5)
IMM GRANULOCYTES # BLD AUTO: 0.09 X10*3/UL (ref 0–0.5)
IMM GRANULOCYTES NFR BLD AUTO: 0.8 % (ref 0–0.9)
KETONES UR STRIP.AUTO-MCNC: NEGATIVE MG/DL
LEUKOCYTE ESTERASE UR QL STRIP.AUTO: ABNORMAL
LYMPHOCYTES # BLD AUTO: 1.58 X10*3/UL (ref 0.8–3)
LYMPHOCYTES NFR BLD AUTO: 13.5 %
MCH RBC QN AUTO: 29.3 PG (ref 26–34)
MCHC RBC AUTO-ENTMCNC: 31.7 G/DL (ref 32–36)
MCV RBC AUTO: 93 FL (ref 80–100)
MONOCYTES # BLD AUTO: 0.96 X10*3/UL (ref 0.05–0.8)
MONOCYTES NFR BLD AUTO: 8.2 %
MUCOUS THREADS #/AREA URNS AUTO: ABNORMAL /LPF
NEUTROPHILS # BLD AUTO: 8.97 X10*3/UL (ref 1.6–5.5)
NEUTROPHILS NFR BLD AUTO: 76.9 %
NITRITE UR QL STRIP.AUTO: NEGATIVE
NRBC BLD-RTO: 0 /100 WBCS (ref 0–0)
PH UR STRIP.AUTO: 5.5 [PH]
PHOSPHATE SERPL-MCNC: 2.8 MG/DL (ref 2.5–4.9)
PLATELET # BLD AUTO: 266 X10*3/UL (ref 150–450)
POTASSIUM SERPL-SCNC: 4.3 MMOL/L (ref 3.5–5.3)
PROT UR STRIP.AUTO-MCNC: ABNORMAL MG/DL
RBC # BLD AUTO: 5.52 X10*6/UL (ref 4.5–5.9)
RBC # UR STRIP.AUTO: ABNORMAL /UL
RBC #/AREA URNS AUTO: >20 /HPF
SODIUM SERPL-SCNC: 141 MMOL/L (ref 136–145)
SP GR UR STRIP.AUTO: 1.02
SQUAMOUS #/AREA URNS AUTO: ABNORMAL /HPF
UROBILINOGEN UR STRIP.AUTO-MCNC: NORMAL MG/DL
WBC # BLD AUTO: 11.7 X10*3/UL (ref 4.4–11.3)
WBC #/AREA URNS AUTO: >50 /HPF

## 2025-01-25 RX ORDER — LEVOFLOXACIN 750 MG/1
750 TABLET ORAL EVERY 24 HOURS
Qty: 7 TABLET | Refills: 0 | Status: SHIPPED | OUTPATIENT
Start: 2025-01-25 | End: 2025-02-01

## 2025-01-26 LAB — BACTERIA UR CULT: ABNORMAL

## 2025-01-27 LAB — BACTERIA UR CULT: ABNORMAL

## 2025-02-14 ENCOUNTER — APPOINTMENT (OUTPATIENT)
Dept: PHARMACY | Facility: HOSPITAL | Age: 75
End: 2025-02-14
Payer: MEDICARE

## 2025-02-14 DIAGNOSIS — E11.69 TYPE 2 DIABETES MELLITUS WITH OTHER SPECIFIED COMPLICATION, WITHOUT LONG-TERM CURRENT USE OF INSULIN: ICD-10-CM

## 2025-02-14 NOTE — PROGRESS NOTES
"  Clinical Pharmacy Appointment  Subjective     Patient ID: Noe Quan \"Arabella" is a 74 y.o. male who presents for Diabetes and Weight Loss.    Referring Provider: Paul Ramirez MD      Patient Assistance for Mounjaro/Xarelto approved through 11/11/25. Will have to be renewed prior to that date to prevent lapse in coverage. Medication(s) will be received at no cost to patient from [facility].      Diabetes  He presents for his follow-up diabetic visit. He has type 2 diabetes mellitus. His disease course has been stable.       Diet: Hydration: 24 oz daily - Example foods-Chicken, Ham, Banana, Orange, Apple, Lettuce, Tomato, Fish     Eating less overall     Exercise: None - states back/knee pain too severe to engage in walks of any distance or length or standing for too long. Discussed chair exercises/ water aerobics- patient is not interested at this time    Allergies   Allergen Reactions    Clarithromycin Nausea Only and Nausea/vomiting    Penicillins Rash       Objective     Current DM Pharmacotherapy:   None    Clarifications to above regimen: Self stopped Mounjaro 1/17 was last dose  Adverse Effects: None    SECONDARY PREVENTION  - Statin? Yes, Atorvastatin 40 mg - LDL 27 (4/17/24)   - ACEi/ARB? Yes, Losartan 100 mg  - Aspirin? No    Current monitoring regimen:   Patient is using: glucometer    Testing frequency: Does not currently test     Any episodes of hypoglycemia? No.  Did patient treat episode of hypoglycemia appropriately? N/A    Pertinent PMH Review:  - PMH of Pancreatitis: No  - PMH/FH of Medullary Thyroid Cancer: No  - PMH/FH of Multiple Endocrine Neoplasia (MEN) Type II: No  - PMH of Retinopathy: No  - PMH of Urinary Tract Infections/Yeast Infections: No    Lab Review  BMP  Lab Results   Component Value Date    CALCIUM 10.1 01/24/2025     01/24/2025    K 4.3 01/24/2025    CO2 27 01/24/2025     01/24/2025    BUN 24 (H) 01/24/2025    CREATININE 1.23 01/24/2025    " EGFR 62 01/24/2025     LFTs  Lab Results   Component Value Date    ALT 21 10/22/2024    AST 19 10/22/2024    ALKPHOS 123 10/22/2024    BILITOT 1.1 10/22/2024     FLP  Lab Results   Component Value Date    TRIG 103 04/17/2024    CHOL 88 04/17/2024    LDLF 32 04/26/2023    LDLCALC 27 04/17/2024    HDL 40.6 04/17/2024       The ASCVD Risk score (Adrien ESTRELLA, et al., 2019) failed to calculate for the following reasons:    The valid total cholesterol range is 130 to 320 mg/dL  Urine Microalbumin  Lab Results   Component Value Date    MICROALBCREA 7.2 04/17/2024     Weight Management  Wt Readings from Last 3 Encounters:   10/22/24 (!) 155 kg (342 lb)   04/17/24 (!) 153 kg (337 lb)   04/26/23 (!) 152 kg (334 lb)      There is no height or weight on file to calculate BMI.   A1C  Lab Results   Component Value Date    HGBA1C 5.8 (H) 10/22/2024    HGBA1C 5.9 (H) 04/17/2024    HGBA1C 5.9 (H) 10/31/2023     Assessment/Plan     Problem List Items Addressed This Visit       Type 2 diabetes mellitus, without long-term current use of insulin (Multi)     Patient's goal A1c is < 7%.  Is pt at goal? Yes, 5.8%  Patient's SMBGs are N/A.     Rationale for plan: Patient self-discontinued Mounjaro after last visit. Concerned this was the cause of his bladder infection. Counseled patient that this is highly unlikely and has not been a reportedly side effect of Mounjaro.     Patient does not wish to resume medication without confirmation from PCP.              Patient encouraged to call me directly should he decide to resume Mounjaro. Otherwise PAP for Xarelto will need renewed next year and follow-up will be at that time. Patient provided pheon number for [facility] to discuss any prescription refill needs.     Follow up: I recommend diabetes care be at patient's discretion    Ellen Fernández PharmD, BCACP  Clinical Pharmacy Specialist, Primary Care       Continue all meds under the continuation of care with the referring provider and clinical  pharmacy team

## 2025-02-14 NOTE — ASSESSMENT & PLAN NOTE
Patient's goal A1c is < 7%.  Is pt at goal? Yes, 5.8%  Patient's SMBGs are N/A.     Rationale for plan: Patient self-discontinued Mounjaro after last visit. Concerned this was the cause of his bladder infection. Counseled patient that this is highly unlikely and has not been a reportedly side effect of Mounjaro.     Patient does not wish to resume medication without confirmation from PCP.

## 2025-02-17 ENCOUNTER — TELEPHONE (OUTPATIENT)
Dept: PHARMACY | Facility: HOSPITAL | Age: 75
End: 2025-02-17

## 2025-02-17 ENCOUNTER — APPOINTMENT (OUTPATIENT)
Dept: UROLOGY | Facility: CLINIC | Age: 75
End: 2025-02-17
Payer: MEDICARE

## 2025-02-17 DIAGNOSIS — E11.69 TYPE 2 DIABETES MELLITUS WITH OTHER SPECIFIED COMPLICATION, WITHOUT LONG-TERM CURRENT USE OF INSULIN: Primary | ICD-10-CM

## 2025-02-17 NOTE — TELEPHONE ENCOUNTER
Patient will restart Mounjaro 5 mg/0.5 mL - once weekly, understands to call if any BREANN, knows there are refills available at [facility]. Follow-up in 3 weeks.     Ellen CortezD, BCACP  Clinical Pharmacy Specialist, Primary Care

## 2025-02-24 PROCEDURE — RXMED WILLOW AMBULATORY MEDICATION CHARGE

## 2025-02-26 ENCOUNTER — PHARMACY VISIT (OUTPATIENT)
Dept: PHARMACY | Facility: CLINIC | Age: 75
End: 2025-02-26
Payer: MEDICARE

## 2025-03-14 ENCOUNTER — APPOINTMENT (OUTPATIENT)
Dept: PHARMACY | Facility: HOSPITAL | Age: 75
End: 2025-03-14
Payer: MEDICARE

## 2025-03-14 DIAGNOSIS — E11.69 TYPE 2 DIABETES MELLITUS WITH OTHER SPECIFIED COMPLICATION, WITHOUT LONG-TERM CURRENT USE OF INSULIN: ICD-10-CM

## 2025-03-14 RX ORDER — TIRZEPATIDE 7.5 MG/.5ML
7.5 INJECTION, SOLUTION SUBCUTANEOUS
Qty: 2 ML | Refills: 2 | Status: SHIPPED | OUTPATIENT
Start: 2025-03-16

## 2025-03-14 NOTE — PROGRESS NOTES
"  Clinical Pharmacy Appointment  Subjective     Patient ID: Noe Quan \"Arabella" is a 74 y.o. male who presents for No chief complaint on file..    Referring Provider: Paul Ramirez MD      Patient Assistance for Mounjaro/Xarelto approved through 11/11/25. Will have to be renewed prior to that date to prevent lapse in coverage. Medication(s) will be received at no cost to patient from [facility].      Diabetes  He presents for his follow-up diabetic visit. He has type 2 diabetes mellitus. His disease course has been stable.       Diet: Hydration: 24 oz daily - Example foods-Chicken, Ham, Banana, Orange, Apple, Lettuce, Tomato, Fish     Drinks 2% milk with every meal and OJ.     Snacks: raisins and carrots     Eating less overall - 2 meals per day- small breakfast and larger dinner     Exercise: None - states back/knee pain too severe to engage in walks of any distance or length or standing for too long. Discussed chair exercises/ water aerobics- patient is not interested at this time    Allergies   Allergen Reactions    Clarithromycin Nausea Only and Nausea/vomiting    Penicillins Rash       Objective     Current DM Pharmacotherapy:   Mounjaro 5 mg/0.5 mL - once weekly (Wed)    Clarifications to above regimen: none  Adverse Effects: Constipation- started stool softeners earlier this week - 3 BM this week     SECONDARY PREVENTION  - Statin? Yes, Atorvastatin 40 mg - LDL 27 (4/17/24)   - ACEi/ARB? Yes, Losartan 100 mg  - Aspirin? No    Current monitoring regimen:   Patient is using: glucometer    Testing frequency: Does not currently test     Any episodes of hypoglycemia? No.  Did patient treat episode of hypoglycemia appropriately? N/A    Pertinent PMH Review:  - PMH of Pancreatitis: No  - PMH/FH of Medullary Thyroid Cancer: No  - PMH/FH of Multiple Endocrine Neoplasia (MEN) Type II: No  - PMH of Retinopathy: No  - PMH of Urinary Tract Infections/Yeast Infections: No    Lab Review  BMP  Lab " Results   Component Value Date    CALCIUM 10.1 01/24/2025     01/24/2025    K 4.3 01/24/2025    CO2 27 01/24/2025     01/24/2025    BUN 24 (H) 01/24/2025    CREATININE 1.23 01/24/2025    EGFR 62 01/24/2025     LFTs  Lab Results   Component Value Date    ALT 21 10/22/2024    AST 19 10/22/2024    ALKPHOS 123 10/22/2024    BILITOT 1.1 10/22/2024     FLP  Lab Results   Component Value Date    TRIG 103 04/17/2024    CHOL 88 04/17/2024    LDLF 32 04/26/2023    LDLCALC 27 04/17/2024    HDL 40.6 04/17/2024       The ASCVD Risk score (Adrien ESTRELLA, et al., 2019) failed to calculate for the following reasons:    The valid total cholesterol range is 130 to 320 mg/dL  Urine Microalbumin  Lab Results   Component Value Date    MICROALBCREA 7.2 04/17/2024     Weight Management  Wt Readings from Last 3 Encounters:   10/22/24 (!) 155 kg (342 lb)   04/17/24 (!) 153 kg (337 lb)   04/26/23 (!) 152 kg (334 lb)      There is no height or weight on file to calculate BMI.   A1C  Lab Results   Component Value Date    HGBA1C 5.8 (H) 10/22/2024    HGBA1C 5.9 (H) 04/17/2024    HGBA1C 5.9 (H) 10/31/2023     Assessment/Plan     Problem List Items Addressed This Visit       Type 2 diabetes mellitus, without long-term current use of insulin (Multi)     Patient's goal A1c is < 7%.  Is pt at goal? Yes, 5.8%  Patient's SMBGs are N/A.     Rationale for plan: Some constipation with Mounjaro restart, alleviated by stool softeners. Feels he is urinating more often including at night.     Medication Changes:  INCREASE  Mounjaro 7.5 mg/0.5 mL - once weekly     Future Considerations:  Further titration as tolerated            Follow up: I recommend diabetes care be 4 weeks    Ellen CortezD, BCACP  Clinical Pharmacy Specialist, Primary Care       Continue all meds under the continuation of care with the referring provider and clinical pharmacy team

## 2025-03-14 NOTE — ASSESSMENT & PLAN NOTE
Patient's goal A1c is < 7%.  Is pt at goal? Yes, 5.8%  Patient's SMBGs are N/A.     Rationale for plan: Some constipation with Mounjaro restart, alleviated by stool softeners. Feels he is urinating more often including at night.     Medication Changes:  INCREASE  Mounjaro 7.5 mg/0.5 mL - once weekly     Future Considerations:  Further titration as tolerated

## 2025-03-17 ENCOUNTER — APPOINTMENT (OUTPATIENT)
Dept: UROLOGY | Facility: CLINIC | Age: 75
End: 2025-03-17
Payer: MEDICARE

## 2025-03-17 VITALS — TEMPERATURE: 97.3 F

## 2025-03-17 DIAGNOSIS — N30.90 CYSTITIS: ICD-10-CM

## 2025-03-17 DIAGNOSIS — N40.1 BENIGN PROSTATIC HYPERPLASIA WITH URINARY OBSTRUCTION: ICD-10-CM

## 2025-03-17 DIAGNOSIS — R31.21 ASYMPTOMATIC MICROSCOPIC HEMATURIA: Primary | ICD-10-CM

## 2025-03-17 DIAGNOSIS — R82.90 ABNORMAL URINALYSIS: ICD-10-CM

## 2025-03-17 DIAGNOSIS — N52.8 OTHER MALE ERECTILE DYSFUNCTION: ICD-10-CM

## 2025-03-17 DIAGNOSIS — R31.29 OTHER MICROSCOPIC HEMATURIA: ICD-10-CM

## 2025-03-17 DIAGNOSIS — N13.8 BENIGN PROSTATIC HYPERPLASIA WITH URINARY OBSTRUCTION: ICD-10-CM

## 2025-03-17 LAB
POC APPEARANCE, URINE: CLEAR
POC BILIRUBIN, URINE: NEGATIVE
POC BLOOD, URINE: ABNORMAL
POC COLOR, URINE: YELLOW
POC GLUCOSE, URINE: NEGATIVE MG/DL
POC KETONES, URINE: NEGATIVE MG/DL
POC LEUKOCYTES, URINE: ABNORMAL
POC NITRITE,URINE: NEGATIVE
POC PH, URINE: 5 PH
POC PROTEIN, URINE: NEGATIVE MG/DL
POC SPECIFIC GRAVITY, URINE: 1.02
POC UROBILINOGEN, URINE: 0.2 EU/DL

## 2025-03-17 PROCEDURE — G2211 COMPLEX E/M VISIT ADD ON: HCPCS | Performed by: STUDENT IN AN ORGANIZED HEALTH CARE EDUCATION/TRAINING PROGRAM

## 2025-03-17 PROCEDURE — 1126F AMNT PAIN NOTED NONE PRSNT: CPT | Performed by: STUDENT IN AN ORGANIZED HEALTH CARE EDUCATION/TRAINING PROGRAM

## 2025-03-17 PROCEDURE — 4010F ACE/ARB THERAPY RXD/TAKEN: CPT | Performed by: STUDENT IN AN ORGANIZED HEALTH CARE EDUCATION/TRAINING PROGRAM

## 2025-03-17 PROCEDURE — 1160F RVW MEDS BY RX/DR IN RCRD: CPT | Performed by: STUDENT IN AN ORGANIZED HEALTH CARE EDUCATION/TRAINING PROGRAM

## 2025-03-17 PROCEDURE — 81003 URINALYSIS AUTO W/O SCOPE: CPT | Performed by: STUDENT IN AN ORGANIZED HEALTH CARE EDUCATION/TRAINING PROGRAM

## 2025-03-17 PROCEDURE — 1159F MED LIST DOCD IN RCRD: CPT | Performed by: STUDENT IN AN ORGANIZED HEALTH CARE EDUCATION/TRAINING PROGRAM

## 2025-03-17 PROCEDURE — 99204 OFFICE O/P NEW MOD 45 MIN: CPT | Performed by: STUDENT IN AN ORGANIZED HEALTH CARE EDUCATION/TRAINING PROGRAM

## 2025-03-17 ASSESSMENT — PAIN SCALES - GENERAL: PAINLEVEL_OUTOF10: 0-NO PAIN

## 2025-03-17 NOTE — PROGRESS NOTES
Scribed for Dr. Lamin Choudhury by Mili Lou . I, Dr. Lamin Choudhury have personally reviewed and agreed with the information entered by the Virtual Scribe. 03/17/25.    ASSESSMENT:  Problem List Items Addressed This Visit       Abnormal urinalysis    Relevant Orders    CT urography w 3D volume rendered imaging    Creatinine, Serum    Microscopic Only, Urine (Completed)    Enlarged prostate with lower urinary tract symptoms (LUTS)    Relevant Orders    CT urography w 3D volume rendered imaging    Creatinine, Serum     Other Visit Diagnoses       Asymptomatic microscopic hematuria    -  Primary    Relevant Orders    CT urography w 3D volume rendered imaging    Creatinine, Serum    POCT UA Automated manually resulted (Completed)    Cystitis        Other male erectile dysfunction        Other microscopic hematuria               PLAN:  #Microscopic hematuria  Patient had a UTI in 01/25 with gross hematuria concerns.   I reviewed his CT scan and renal US in detail.  Renal US shows Questionable mass involving the posterior wall measuring up to 1.6 cm.  Patient declined cystoscopy after a thorough discussion of the risks, benefits, and alternatives.   RTC in 2 weeks with CT urogram results and discuss further  UA today to assess for ongoing bleeding    Discussion:  To address the patient’s hematuria. I recommended the patient follow-up for hematuria to include cystoscopy, CT urogram. Patient is aware of the risks associated with intravenous contrast. There is no history of renal dysfunction. Risks of cystoscopy were discussed including risk of hematuria, UTI and discomfort. Patient acknowledged they understood and desires to proceed.     #BPH   #OAB  -Patient has a 168 gm prostate with obstructive sx. HE has a weak urinary stream with urinary frequency.   - He is taking terazosin without any relief.   -We discussed possibility of HoLEP  -Patient elects to continue Terazosin as of now.     #Renal cysts  We  discussed the natural history of simple renal cysts, however they cannot be characterized on the scan.  -I  recommend getting a CT urogram to further evaluate this.     #ED:  Patient endorses ED but states this is not his acute concerns but would prefers to address it at subsequent appointments.     All questions were answered to the patient’s satisfaction.  Patient agrees with the plan and wishes to proceed.  Continue follow-up for ongoing care of his chronic medical conditions.       History of Present Illness (HPI):  Noe presents as a new patient for an evaluation.  The patient’s EMR has been reviewed.  Lives in [place], [place].     Hx of BPH, and microscopic hematuria.   Urine microanalysis positive for blood in Jan 2025.   Albeit, in setting of concurrent UTI. (E. Coli)  With bladder stones vs mass on CT/US (Jan 2025).   Continues on xarelto for anticoagulation. Referred to me for continued care.   He is on mounjaro for weight loss.     TODAY: (03/17/25)  Patient had  UTI in January when he had hematuria concerns.  HE had not had any other UTIs.   He endorses weak urinary stream and has to strain about 3 minutes to start a stream.   He had Ntf x 2-3, Dtf every 2 hours  He is on Terazosin without much improvements.   He also had ED concerns.     Urine microanalyses:  >20 RBC's, >50 WBC's ~ (01/24/25)  >6-10 RBC's, 21-50 WBC's ~ (10/22/24)    Urine cultures:  01/24/25 ~ E. Coli (thapa-sensitive)    PSA summary:  3.40 ~ April 2024  3.20 ~ April 2023  2.25 ~ April 2022    CT A&P (01/24/25) personally reviewed and independently interpreted.  Prostate is 168 gm, atrophy of the left kidney with no stones or hydronephrosis B/l, multiple cysts difficult to characterize. Layering debris, possible consistent with bladder stones.     Bladder ultrasound (01/24/25) personally reviewed and independently interpreted.  Suboptimal evaluation secondary to incomplete distention of the  urinary bladder. Postvoid residual of 11 cc.  Questionable mass  involving the posterior wall measuring up to 1.6 cm.    PMH: HTN, HLD, T2DM  PSH: Denies  FH: Denies FH of  malignancy.  SH: Non-smoker.     Past Medical History:   Diagnosis Date    Age-related nuclear cataract, bilateral     Diabetes mellitus (Multi)     Dry eyes     Refractive error      History reviewed. No pertinent surgical history.  Family History   Problem Relation Name Age of Onset    Cancer Mother      Cancer Sister       Social History     Tobacco Use   Smoking Status Never   Smokeless Tobacco Never     Current Outpatient Medications   Medication Sig Dispense Refill    atorvastatin (Lipitor) 40 mg tablet Take 1 tablet (40 mg) by mouth once daily at bedtime. 90 tablet 3    hydroCHLOROthiazide (HYDRODiuril) 25 mg tablet Take 1 tablet (25 mg) by mouth once daily. 90 tablet 3    losartan (Cozaar) 100 mg tablet Take 1 tablet (100 mg) by mouth once daily. 90 tablet 3    metFORMIN (Glucophage) 500 mg tablet Take 1 tablet (500 mg) by mouth 2 times a day with meals. (Patient not taking: Reported on 3/17/2025) 180 tablet 3    metoprolol tartrate (Lopressor) 25 mg tablet Take 1 tablet (25 mg) by mouth 2 times a day. 180 tablet 3    psyllium (Metamucil) powder Take 1 Dose (5.8 g) by mouth once daily.      rivaroxaban (Xarelto) 20 mg tablet Take 1 tablet (20 mg) by mouth once daily in the evening. Take with meals. 90 tablet 3    terazosin (Hytrin) 10 mg capsule Take 1 capsule (10 mg) by mouth once daily. 90 capsule 3    tirzepatide (Mounjaro) 7.5 mg/0.5 mL pen injector Inject 7.5 mg under the skin 1 (one) time per week. 2 mL 2     No current facility-administered medications for this visit.     Allergies   Allergen Reactions    Clarithromycin Nausea Only and Nausea/vomiting    Penicillins Rash     Past medical, surgical, family and social history in the chart was reviewed and is accurate including any additions to what is in this HPI.    REVIEW OF SYSTEMS (ROS):   Constitutional: denies any  unintentional weight loss or change in strength.  Integumentary: denies any rashes or pruritus.  Eyes: denies any double vision or eye pain.  Ear/Nose/Mouth/Throat: denies any nosebleeds or gum bleeds.  Cardiovascular: denies any chest pain or syncope.  Respiratory: denies hemoptysis.  Gastrointestinal: denies nausea or vomiting.  Musculoskeletal: denies muscle cramping or weakness.  Neurologic: denies convulsions or seizures.  Hematologic/Lymphatic: denies bleeding tendencies.  Endocrine: denies heat/cold intolerance.  All other systems have been reviewed and are negative unless otherwise noted in the HPI.     OBJECTIVE:  Visit Vitals  Temp 36.3 °C (97.3 °F)     PHYSICAL EXAM:  Constitutional: No obvious distress.  Eyes: Non-injected conjunctiva, sclera clear, EOMI.  Ears/Nose/Mouth/Throat: No obvious drainage per ears or nose.  Cardiovascular: Extremities are warm and well perfused. No edema, cyanosis or pallor.  Respiratory: No audible wheezing/stridor; respirations do not appear labored.  Gastrointestinal: Abdomen soft, not distended.  Musculoskeletal: Normal ROM of extremities.  Skin: No obvious rashes or open sores.  Neurologic: Alert and oriented, CN 2-12 grossly intact.  Psychiatric: Answers questions appropriately with normal affect.  Hematologic/Lymphatic/Immunologic: No obvious bruises or sites of spontaneous bleeding.  Genitourinary: No CVA tenderness, bladder not palpable.     LABS & IMAGING:  Basic Labs:  Lab Results   Component Value Date    WBC 11.7 (H) 01/24/2025    HGB 16.2 01/24/2025    HCT 51.1 01/24/2025     01/24/2025     01/24/2025    K 4.3 01/24/2025     01/24/2025    ALT 21 10/22/2024    AST 19 10/22/2024    CREATININE 1.23 01/24/2025    BUN 24 (H) 01/24/2025    CO2 27 01/24/2025    TSH 1.53 04/17/2024       Scribed for Dr. Lamin Choudhury by Vaughn Ronquillo.  By signing my name below, Mili EASTON, [employer] attest that this documentation has been prepared under the direction  and in the presence of Lamin Choudhury MD. All medical record entries made by the Scribe were at my direction or personally dictated by me. I have reviewed the chart and agree that the record accurately reflects my personal performance of the history, physical exam, discussion and plan.

## 2025-03-18 LAB
BACTERIA #/AREA URNS HPF: ABNORMAL /HPF
HYALINE CASTS #/AREA URNS LPF: ABNORMAL /LPF
RBC #/AREA URNS HPF: ABNORMAL /HPF
SERVICE CMNT-IMP: ABNORMAL
SQUAMOUS #/AREA URNS HPF: ABNORMAL /HPF
WBC #/AREA URNS HPF: ABNORMAL /HPF

## 2025-03-20 PROCEDURE — RXMED WILLOW AMBULATORY MEDICATION CHARGE

## 2025-03-21 ENCOUNTER — PHARMACY VISIT (OUTPATIENT)
Dept: PHARMACY | Facility: CLINIC | Age: 75
End: 2025-03-21
Payer: MEDICARE

## 2025-03-21 PROCEDURE — RXMED WILLOW AMBULATORY MEDICATION CHARGE

## 2025-03-27 ENCOUNTER — PHARMACY VISIT (OUTPATIENT)
Dept: PHARMACY | Facility: CLINIC | Age: 75
End: 2025-03-27
Payer: MEDICARE

## 2025-04-11 ENCOUNTER — APPOINTMENT (OUTPATIENT)
Dept: PHARMACY | Facility: HOSPITAL | Age: 75
End: 2025-04-11
Payer: MEDICARE

## 2025-04-11 DIAGNOSIS — E11.69 TYPE 2 DIABETES MELLITUS WITH OTHER SPECIFIED COMPLICATION, WITHOUT LONG-TERM CURRENT USE OF INSULIN: Primary | ICD-10-CM

## 2025-04-11 PROCEDURE — RXMED WILLOW AMBULATORY MEDICATION CHARGE

## 2025-04-11 RX ORDER — TIRZEPATIDE 10 MG/.5ML
1 INJECTION, SOLUTION SUBCUTANEOUS
Qty: 2 ML | Refills: 1 | Status: SHIPPED | OUTPATIENT
Start: 2025-04-13

## 2025-04-11 NOTE — PROGRESS NOTES
"  Clinical Pharmacy Appointment  Subjective     Patient ID: Noe Quan \"Arabella" is a 74 y.o. male who presents for No chief complaint on file..    Referring Provider: Paul Ramirez MD      Patient Assistance for Mounjaro/Xarelto approved through 11/11/25. Will have to be renewed prior to that date to prevent lapse in coverage. Medication(s) will be received at no cost to patient from [facility].      Diabetes  He presents for his follow-up diabetic visit. He has type 2 diabetes mellitus. His disease course has been stable.       Diet: Hydration: 24 oz daily - Example foods-Chicken, Ham, Banana, Orange, Apple, Lettuce, Tomato, Fish     Drinks 2% milk with every meal and OJ.     Snacks: raisins and carrots     Eating less overall - 2 meals per day- small breakfast and larger dinner     Exercise: None - states back/knee pain too severe to engage in walks of any distance or length or standing for too long. Discussed chair exercises/ water aerobics- patient is not interested at this time    Allergies   Allergen Reactions    Clarithromycin Nausea Only and Nausea/vomiting    Penicillins Rash       Objective     Current DM Pharmacotherapy:   Mounjaro 7.5 mg/0.5 mL - once weekly (Wed/Thursday)    Clarifications to above regimen: took second dose of 7.5 mg yesterday   Adverse Effects: Constipation- same as previous dose - thinks this week has been a little better.     SECONDARY PREVENTION  - Statin? Yes, Atorvastatin 40 mg - LDL 27 (4/17/24)   - ACEi/ARB? Yes, Losartan 100 mg  - Aspirin? No    Current monitoring regimen:   Patient is using: glucometer    Testing frequency: Does not currently test     Any episodes of hypoglycemia? No.  Did patient treat episode of hypoglycemia appropriately? N/A    Pertinent PMH Review:  - PMH of Pancreatitis: No  - PMH/FH of Medullary Thyroid Cancer: No  - PMH/FH of Multiple Endocrine Neoplasia (MEN) Type II: No  - PMH of Retinopathy: No  - PMH of Urinary Tract " Infections/Yeast Infections: No    Lab Review  BMP  Lab Results   Component Value Date    CALCIUM 10.1 01/24/2025     01/24/2025    K 4.3 01/24/2025    CO2 27 01/24/2025     01/24/2025    BUN 24 (H) 01/24/2025    CREATININE 1.23 01/24/2025    EGFR 62 01/24/2025     LFTs  Lab Results   Component Value Date    ALT 21 10/22/2024    AST 19 10/22/2024    ALKPHOS 123 10/22/2024    BILITOT 1.1 10/22/2024     FLP  Lab Results   Component Value Date    TRIG 103 04/17/2024    CHOL 88 04/17/2024    LDLF 32 04/26/2023    LDLCALC 27 04/17/2024    HDL 40.6 04/17/2024       The ASCVD Risk score (Adrien ESTRELLA, et al., 2019) failed to calculate for the following reasons:    The valid total cholesterol range is 130 to 320 mg/dL  Urine Microalbumin  Lab Results   Component Value Date    MICROALBCREA 7.2 04/17/2024     Weight Management  Wt Readings from Last 3 Encounters:   10/22/24 (!) 155 kg (342 lb)   04/17/24 (!) 153 kg (337 lb)   04/26/23 (!) 152 kg (334 lb)      There is no height or weight on file to calculate BMI.   A1C  Lab Results   Component Value Date    HGBA1C 5.8 (H) 10/22/2024    HGBA1C 5.9 (H) 04/17/2024    HGBA1C 5.9 (H) 10/31/2023     Assessment/Plan     Problem List Items Addressed This Visit       Type 2 diabetes mellitus, without long-term current use of insulin (Multi) - Primary         Follow up: I recommend diabetes care be 4 weeks    Ellen Fernández PharmD, BCACP  Clinical Pharmacy Specialist, Primary Care       Continue all meds under the continuation of care with the referring provider and clinical pharmacy team

## 2025-04-14 ENCOUNTER — APPOINTMENT (OUTPATIENT)
Dept: UROLOGY | Facility: CLINIC | Age: 75
End: 2025-04-14
Payer: MEDICARE

## 2025-04-14 ENCOUNTER — PHARMACY VISIT (OUTPATIENT)
Dept: PHARMACY | Facility: CLINIC | Age: 75
End: 2025-04-14
Payer: MEDICARE

## 2025-04-17 DIAGNOSIS — N40.0 BENIGN PROSTATIC HYPERPLASIA, UNSPECIFIED WHETHER LOWER URINARY TRACT SYMPTOMS PRESENT: ICD-10-CM

## 2025-04-17 DIAGNOSIS — E78.2 MIXED HYPERLIPIDEMIA: ICD-10-CM

## 2025-04-17 RX ORDER — TERAZOSIN 10 MG/1
10 CAPSULE ORAL DAILY
Qty: 90 CAPSULE | Refills: 0 | Status: SHIPPED | OUTPATIENT
Start: 2025-04-17

## 2025-04-17 RX ORDER — ATORVASTATIN CALCIUM 40 MG/1
TABLET, FILM COATED ORAL
Qty: 90 TABLET | Refills: 0 | Status: SHIPPED | OUTPATIENT
Start: 2025-04-17

## 2025-04-22 ENCOUNTER — APPOINTMENT (OUTPATIENT)
Dept: PRIMARY CARE | Facility: CLINIC | Age: 75
End: 2025-04-22
Payer: MEDICARE

## 2025-04-22 VITALS
OXYGEN SATURATION: 94 % | HEART RATE: 60 BPM | WEIGHT: 315 LBS | SYSTOLIC BLOOD PRESSURE: 134 MMHG | DIASTOLIC BLOOD PRESSURE: 80 MMHG | BODY MASS INDEX: 49.66 KG/M2

## 2025-04-22 DIAGNOSIS — Z00.00 HEALTHCARE MAINTENANCE: ICD-10-CM

## 2025-04-22 DIAGNOSIS — E11.59 TYPE 2 DIABETES MELLITUS WITH OTHER CIRCULATORY COMPLICATION, WITHOUT LONG-TERM CURRENT USE OF INSULIN: ICD-10-CM

## 2025-04-22 DIAGNOSIS — Z00.00 MEDICARE ANNUAL WELLNESS VISIT, SUBSEQUENT: Primary | ICD-10-CM

## 2025-04-22 DIAGNOSIS — I25.118 CORONARY ARTERY DISEASE OF NATIVE ARTERY OF NATIVE HEART WITH STABLE ANGINA PECTORIS: ICD-10-CM

## 2025-04-22 DIAGNOSIS — Z12.11 COLON CANCER SCREENING: ICD-10-CM

## 2025-04-22 DIAGNOSIS — R97.20 ELEVATED PSA: ICD-10-CM

## 2025-04-22 DIAGNOSIS — R31.29 MICROSCOPIC HEMATURIA: ICD-10-CM

## 2025-04-22 DIAGNOSIS — E55.9 VITAMIN D DEFICIENCY: ICD-10-CM

## 2025-04-22 DIAGNOSIS — I10 HYPERTENSION, UNSPECIFIED TYPE: ICD-10-CM

## 2025-04-22 PROCEDURE — 4010F ACE/ARB THERAPY RXD/TAKEN: CPT | Performed by: STUDENT IN AN ORGANIZED HEALTH CARE EDUCATION/TRAINING PROGRAM

## 2025-04-22 PROCEDURE — 1170F FXNL STATUS ASSESSED: CPT | Performed by: STUDENT IN AN ORGANIZED HEALTH CARE EDUCATION/TRAINING PROGRAM

## 2025-04-22 PROCEDURE — 1159F MED LIST DOCD IN RCRD: CPT | Performed by: STUDENT IN AN ORGANIZED HEALTH CARE EDUCATION/TRAINING PROGRAM

## 2025-04-22 PROCEDURE — 1123F ACP DISCUSS/DSCN MKR DOCD: CPT | Performed by: STUDENT IN AN ORGANIZED HEALTH CARE EDUCATION/TRAINING PROGRAM

## 2025-04-22 PROCEDURE — 99214 OFFICE O/P EST MOD 30 MIN: CPT | Performed by: STUDENT IN AN ORGANIZED HEALTH CARE EDUCATION/TRAINING PROGRAM

## 2025-04-22 PROCEDURE — G0439 PPPS, SUBSEQ VISIT: HCPCS | Performed by: STUDENT IN AN ORGANIZED HEALTH CARE EDUCATION/TRAINING PROGRAM

## 2025-04-22 PROCEDURE — 3075F SYST BP GE 130 - 139MM HG: CPT | Performed by: STUDENT IN AN ORGANIZED HEALTH CARE EDUCATION/TRAINING PROGRAM

## 2025-04-22 PROCEDURE — 3079F DIAST BP 80-89 MM HG: CPT | Performed by: STUDENT IN AN ORGANIZED HEALTH CARE EDUCATION/TRAINING PROGRAM

## 2025-04-22 PROCEDURE — 1126F AMNT PAIN NOTED NONE PRSNT: CPT | Performed by: STUDENT IN AN ORGANIZED HEALTH CARE EDUCATION/TRAINING PROGRAM

## 2025-04-22 ASSESSMENT — ENCOUNTER SYMPTOMS
LOSS OF SENSATION IN FEET: 0
OCCASIONAL FEELINGS OF UNSTEADINESS: 0
DEPRESSION: 0

## 2025-04-22 ASSESSMENT — PAIN SCALES - GENERAL: PAINLEVEL_OUTOF10: 0-NO PAIN

## 2025-04-22 ASSESSMENT — PATIENT HEALTH QUESTIONNAIRE - PHQ9
1. LITTLE INTEREST OR PLEASURE IN DOING THINGS: NOT AT ALL
SUM OF ALL RESPONSES TO PHQ9 QUESTIONS 1 AND 2: 0
2. FEELING DOWN, DEPRESSED OR HOPELESS: NOT AT ALL

## 2025-04-22 NOTE — PROGRESS NOTES
Noe Quan is a 74 y.o. male seen in Clinic at [facility] by Dr. Paul Ramirez on 04/22/25 for routine care, as well as for management of the following chronic medical conditions: morbid obesity, HTN, DLD, Afib, BPH, T2DM. He presents today for CPE/MCW visit.     This Visit:  - Had seen urology prior to this visit. Did not elect to pursue any procedures or imaging at that time. States that his current urinary symptoms are significantly improved. Is no longer incontinent. Less urgency than previously. No blood in urine. Infrequent nocturia. No pain with urination. Never any incontinence of bowel.   - No falls. No changes in gait. No loss of consciousness. No vision changes. No confusion noted by family members/loved ones.   - Up-titrating Mounjaro and tolerating well.   - Did not pursue stress echo last visit and states that he forgot about this. Will order repeat stress echo as patient would like to have this completed.     Past Medical History: as above   Past Medical History:   Diagnosis Date    Age-related nuclear cataract, bilateral     Diabetes mellitus (Multi)     Dry eyes     Refractive error      Subspecialty Medical Care: Urology, previously hematology     Past Surgical History:   No past surgical history on file.    Medications:  Current Outpatient Medications:     atorvastatin (Lipitor) 40 mg tablet, take 1 tablet (40mg) by mouth once daily at bedtime, Disp: 90 tablet, Rfl: 0    hydroCHLOROthiazide (HYDRODiuril) 25 mg tablet, Take 1 tablet (25 mg) by mouth once daily., Disp: 90 tablet, Rfl: 3    losartan (Cozaar) 100 mg tablet, Take 1 tablet (100 mg) by mouth once daily., Disp: 90 tablet, Rfl: 3    metoprolol tartrate (Lopressor) 25 mg tablet, Take 1 tablet (25 mg) by mouth 2 times a day., Disp: 180 tablet, Rfl: 3    psyllium (Metamucil) powder, Take 1 Dose (5.8 g) by mouth once daily., Disp: , Rfl:     rivaroxaban (Xarelto) 20 mg tablet, Take 1 tablet (20 mg) by mouth once daily in the evening.  Take with meals., Disp: 90 tablet, Rfl: 3    terazosin (Hytrin) 10 mg capsule, TAKE ONE CAPSULE BY MOUTH EVERY DAY, Disp: 90 capsule, Rfl: 0    tirzepatide (Mounjaro) 10 mg/0.5 mL pen injector, Inject 10 mg under the skin 1 (one) time per week., Disp: 2 mL, Rfl: 1  Pharmacy: [facility] ([facility])    Allergies: Clarithromycin, PCN   Allergies   Allergen Reactions    Clarithromycin Nausea Only and Nausea/vomiting    Penicillins Rash     Immunizations:   - Flu advised annually   - Staying UTD with COVID boosters recommended  - RSV recommended    - PCV/PPSV UTD  - unknown last Tdap  - Shingrix previously recommended     Family History:   Family History   Problem Relation Name Age of Onset    Cancer Mother      Cancer Sister       Social History:   Home/Living Situation/Falls/Safety Assessment: lives alone  Education/Employment/Work/Vocational: retired   Activities: limited physical activity   Drug Use: never smoker, rare alcohol use  Diet: Discussed dietary changes, currently eating 1-2 meals per day on Mounjaro  Depression/Anxiety: denies   Sexuality/Contraception/Menstrual History: not sexually active   Sleep: Sleeping well, previous STEVE concerns but work-up deferred     Patient Information:  Health Insurance: Medicare   Transportation: [facility] POA/Guardian: emergency contact, Niece will likely be assigned (Theresa Richie, [phone]); Nephew, Ralf Quan, [phone]  Contact Information: [phone]    Visit Vitals  /80 (BP Location: Right arm, Patient Position: Sitting, BP Cuff Size: Large adult)   Pulse 60   Wt 148 kg (326 lb 9.6 oz)   SpO2 94%   BMI 49.66 kg/m²   Smoking Status Never   BSA 2.66 m²      PHYSICAL EXAM:   General: well appearing  male, pleasant and engaged in encounter    HEENT: NCAT, MMM, large neck circumference   CV: irregularly irregular, distant heart sounds   PULM: CTAB, non-labored respirations   ABD: soft, obese, NT, ND   : no suprapubic or CVA  "tenderness   EXT: WWP, no significant edema   SKIN: no rashes noted   NEURO: A&Ox4, symmetric facies, soft spoken, no significant tremor noted during exam today, no gross motor or sensory deficits, ambulation affected by morbid obesity   PSYCH: pleasant mood, appropriate affect     Assessment/Plan    Noe Quan is a 74 y.o. male seen in Clinic at [facility] by Dr. Paul Ramirez on 04/22/25 for routine care, as well as for management of the following chronic medical conditions: morbid obesity, HTN, DLD, Afib, BPH, T2DM. He presents today for follow up visit.     Overall, Mikey feels well. He states that his incontinence has significantly improved since last visit and he has not had any episodes. He had seen urology, but was not interested in further work-up at that time as he feels his symptoms have resolved. Detailed plan below.     #Incontinence - improved  #Renal Cysts (not fully characterized by lack of IV contrast)   #Prostatic Hypertrophy, Elevated PSA  #Bladder Stones   #Hematuria   :: likely multifactorial including BPH, medication effects (alpha blocker, longstanding; diuretic), obesity, STEVE with CPAP intolerance  :: predominately urge per description, typically \"leaking\" and \"urgency\", not large volume, prior abnormal UA , known T2D, known BPH on longstanding alpha-blocker   [  ] updating CMP, A1C, lytes/glucose/renal function, and CBC, PSA   [x] urology referral for additional recommendations given incomplete characterization and abnormal findings on CT A/P without contrast from early 2025--patient not interested in further work-up (including CT urography, cystoscopy, etc)   - Repeating UA today, if still showing microscopic hematuria patient will re-engage with urology team   [x] PSA today -- ELEVATED; repeat in 1 month, if persistently elevated, not plan for urology follow up  [  ] continue to monitor gait, mental status; if any ongoing concerns and with concurrent urinary complaints, may consider " "CT head imaging (NPH assessment)     #R Sided Chest Pain  - intermittent, around once per week per patient   - describes as \"feeling like a pulled muscle\"   - patient profoundly deconditioned making it difficult to determine if occurring with exertion; does also occur at rest   - EKG at last visit: overall similar morphology, T wave inversion in lead 3, bradycardic and irregular   [  ] stress echo to further characterize: re-ordered for follow-up     CHRONIC MEDICAL CONDITIONS:   #HTN  - hydrochlorothiazide 25, Losartan 100mg daily, Metoprolol tartrate 25mg BID   - BP well controlled in office today   - RFP today with reassuring renal function: Cr 1.07    #DLD  - Atorva 40  - labs well controlled on current regimen (LDL 38 per labs 04/2025)     #Afib  - B-blocker as above, Xarelto 20mg daily     #BPH, Erectile Dysfunction, Urinary Incontinence   - Terazosin 10mg daily (takes at night before bed to try and mitigate side effects)   - previously evaluated by urology, deferred further workup    #Polycythemia, IMPROVED   - seen by hematology, repeat labs reassuring, no additional workup at this time   - EPO 17, ferritin in 100s   - STEVE testing, likely etiology; patient REFUSES and does not wish to see sleep medicine at this time    #T2DM, morbid obesity   - prior Metformin   - ARB for renoprotection, Statin with LDL at goal  - follows with optho  - A1C 5.8% on current regimen (labs 04/2025); reassuring urine microalbumin:Cr ratio 04/2025   - Continue Mounjaro up-titration as tolerated; currently on 10mg weekly dosing   - Following with pharmacy     #Health Maintenance  CPE/MCW visit: 4/22/2025    Cancer Screening  - Colorectal Cancer Screening: unclear, Cologuard ordered in 2022 and 2023 never completed, encouraged to complete and ordered again   - Lung Cancer Screening: non-smoker   - Prostate Cancer Screening: reassuring 04/2024 --> now elevated to 4.8 (prior 3.4, 3.2)    Laboratory Screening  - Lipid Screen: on statin " at goal   - ASCVD Score: on statin at goal  - A1C, glucose screen: 5.8%  - STI, HIV, Hep B screen: negative 2024  - Hep C screen: negative 2024     Imaging Screening  - AAA screening: non-smoker     Immunizations:   - Influenza: annual recommended   - COVID: recommend staying UTD with booster  - RSV: recommended   - Tdap: unknown  - Prevnar, Pneumovax: complete   - Shingrix: recommended     Other Screening  - Health Literacy Assessment: adequate   - Depression screen: negative   - Home safety/partner violence screen: lives alone   - Hearing/Vision screens: corrective lenses; follows with optho (T2DM)  - Alcohol/tobacco/drug use screen: non-smoker   - Healthcare POA/Advanced Directives: niece, nephew     Patient Discussion:    Please call back the office with any questions at [phone]. In the case of an emergency, please call [phone] or go to the nearest Emergency Department.      Antwon Weir MD   Internal Medicine-Pediatrics PGY-1    RTC in 3-6 months advised, sooner pending workup as initiated as above.     Paul Ramirez MD  Internal Medicine-Pediatrics  [facility] [address], [address]  P: [phone], F: [phone]

## 2025-04-24 LAB
25(OH)D3+25(OH)D2 SERPL-MCNC: 48 NG/ML (ref 30–100)
ALBUMIN SERPL-MCNC: 4.1 G/DL (ref 3.6–5.1)
ALBUMIN/CREAT UR: 5 MG/G CREAT
ALP SERPL-CCNC: 137 U/L (ref 35–144)
ALT SERPL-CCNC: 27 U/L (ref 9–46)
ANION GAP SERPL CALCULATED.4IONS-SCNC: 11 MMOL/L (CALC) (ref 7–17)
APPEARANCE UR: CLEAR
AST SERPL-CCNC: 24 U/L (ref 10–35)
BACTERIA #/AREA URNS HPF: ABNORMAL /HPF
BACTERIA UR CULT: ABNORMAL
BASOPHILS # BLD AUTO: 53 CELLS/UL (ref 0–200)
BASOPHILS NFR BLD AUTO: 0.6 %
BILIRUB SERPL-MCNC: 1 MG/DL (ref 0.2–1.2)
BILIRUB UR QL STRIP: NEGATIVE
BUN SERPL-MCNC: 16 MG/DL (ref 7–25)
CALCIUM SERPL-MCNC: 9.6 MG/DL (ref 8.6–10.3)
CHLORIDE SERPL-SCNC: 103 MMOL/L (ref 98–110)
CHOLEST SERPL-MCNC: 97 MG/DL
CHOLEST/HDLC SERPL: 2.3 (CALC)
CO2 SERPL-SCNC: 28 MMOL/L (ref 20–32)
COLOR UR: YELLOW
CREAT SERPL-MCNC: 1.07 MG/DL (ref 0.7–1.28)
CREAT UR-MCNC: 167 MG/DL (ref 20–320)
EGFRCR SERPLBLD CKD-EPI 2021: 74 ML/MIN/1.73M2
EOSINOPHIL # BLD AUTO: 26 CELLS/UL (ref 15–500)
EOSINOPHIL NFR BLD AUTO: 0.3 %
ERYTHROCYTE [DISTWIDTH] IN BLOOD BY AUTOMATED COUNT: 13.1 % (ref 11–15)
EST. AVERAGE GLUCOSE BLD GHB EST-MCNC: 120 MG/DL
EST. AVERAGE GLUCOSE BLD GHB EST-SCNC: 6.6 MMOL/L
GLUCOSE SERPL-MCNC: 104 MG/DL (ref 65–139)
GLUCOSE UR QL STRIP: NEGATIVE
HBA1C MFR BLD: 5.8 %
HCT VFR BLD AUTO: 47.4 % (ref 38.5–50)
HDLC SERPL-MCNC: 43 MG/DL
HGB BLD-MCNC: 16.2 G/DL (ref 13.2–17.1)
HGB UR QL STRIP: NEGATIVE
HYALINE CASTS #/AREA URNS LPF: ABNORMAL /LPF
KETONES UR QL STRIP: NEGATIVE
LDLC SERPL CALC-MCNC: 38 MG/DL (CALC)
LEUKOCYTE ESTERASE UR QL STRIP: ABNORMAL
LYMPHOCYTES # BLD AUTO: 1584 CELLS/UL (ref 850–3900)
LYMPHOCYTES NFR BLD AUTO: 18 %
MCH RBC QN AUTO: 30.9 PG (ref 27–33)
MCHC RBC AUTO-ENTMCNC: 34.2 G/DL (ref 32–36)
MCV RBC AUTO: 90.3 FL (ref 80–100)
MICROALBUMIN UR-MCNC: 0.8 MG/DL
MONOCYTES # BLD AUTO: 730 CELLS/UL (ref 200–950)
MONOCYTES NFR BLD AUTO: 8.3 %
NEUTROPHILS # BLD AUTO: 6406 CELLS/UL (ref 1500–7800)
NEUTROPHILS NFR BLD AUTO: 72.8 %
NITRITE UR QL STRIP: NEGATIVE
NONHDLC SERPL-MCNC: 54 MG/DL (CALC)
PH UR STRIP: ABNORMAL [PH] (ref 5–8)
PLATELET # BLD AUTO: 199 THOUSAND/UL (ref 140–400)
PMV BLD REES-ECKER: 11.8 FL (ref 7.5–12.5)
POTASSIUM SERPL-SCNC: 4.3 MMOL/L (ref 3.5–5.3)
PROT SERPL-MCNC: 6.9 G/DL (ref 6.1–8.1)
PROT UR QL STRIP: NEGATIVE
PSA FREE MFR SERPL: 33 % (CALC)
PSA FREE SERPL-MCNC: 1.6 NG/ML
PSA SERPL-MCNC: 4.8 NG/ML
RBC # BLD AUTO: 5.25 MILLION/UL (ref 4.2–5.8)
RBC #/AREA URNS HPF: ABNORMAL /HPF
SERVICE CMNT-IMP: ABNORMAL
SODIUM SERPL-SCNC: 142 MMOL/L (ref 135–146)
SP GR UR STRIP: 1.02 (ref 1–1.03)
SQUAMOUS #/AREA URNS HPF: ABNORMAL /HPF
TRIGL SERPL-MCNC: 81 MG/DL
TSH SERPL-ACNC: 1.18 MIU/L (ref 0.4–4.5)
WBC # BLD AUTO: 8.8 THOUSAND/UL (ref 3.8–10.8)
WBC #/AREA URNS HPF: ABNORMAL /HPF

## 2025-05-05 DIAGNOSIS — I48.0 PAROXYSMAL ATRIAL FIBRILLATION (MULTI): ICD-10-CM

## 2025-05-05 DIAGNOSIS — I10 BENIGN ESSENTIAL HYPERTENSION: ICD-10-CM

## 2025-05-06 RX ORDER — LOSARTAN POTASSIUM 100 MG/1
TABLET ORAL
Qty: 90 TABLET | Refills: 3 | Status: SHIPPED | OUTPATIENT
Start: 2025-05-06

## 2025-05-06 RX ORDER — METOPROLOL TARTRATE 25 MG/1
TABLET, FILM COATED ORAL
Qty: 180 TABLET | Refills: 3 | Status: SHIPPED | OUTPATIENT
Start: 2025-05-06

## 2025-05-06 RX ORDER — HYDROCHLOROTHIAZIDE 25 MG/1
25 TABLET ORAL DAILY
Qty: 90 TABLET | Refills: 3 | Status: SHIPPED | OUTPATIENT
Start: 2025-05-06

## 2025-05-11 ASSESSMENT — ACTIVITIES OF DAILY LIVING (ADL)
DRESSING: INDEPENDENT
BATHING: INDEPENDENT
DOING_HOUSEWORK: NEEDS ASSISTANCE
MANAGING_FINANCES: INDEPENDENT
TAKING_MEDICATION: INDEPENDENT
GROCERY_SHOPPING: INDEPENDENT

## 2025-05-16 ENCOUNTER — APPOINTMENT (OUTPATIENT)
Dept: PHARMACY | Facility: HOSPITAL | Age: 75
End: 2025-05-16
Payer: MEDICARE

## 2025-05-16 DIAGNOSIS — E11.69 TYPE 2 DIABETES MELLITUS WITH OTHER SPECIFIED COMPLICATION, WITHOUT LONG-TERM CURRENT USE OF INSULIN: ICD-10-CM

## 2025-05-16 PROCEDURE — RXMED WILLOW AMBULATORY MEDICATION CHARGE

## 2025-05-16 RX ORDER — TIRZEPATIDE 12.5 MG/.5ML
12.5 INJECTION, SOLUTION SUBCUTANEOUS
Qty: 2 ML | Refills: 2 | Status: SHIPPED | OUTPATIENT
Start: 2025-05-18

## 2025-05-16 NOTE — PROGRESS NOTES
"  Clinical Pharmacy Appointment  Subjective     Patient ID: Noe Quan \"Arabella" is a 75 y.o. male who presents for No chief complaint on file..    Referring Provider: Paul Ramirez MD      Patient Assistance for Mounjaro/Xarelto approved through 11/11/25. Will have to be renewed prior to that date to prevent lapse in coverage. Medication(s) will be received at no cost to patient from [facility].      Diabetes  He presents for his follow-up diabetic visit. He has type 2 diabetes mellitus. His disease course has been stable.       Diet: Hydration: 24 oz daily - Example foods-Chicken, Ham, Banana, Orange, Apple, Lettuce, Tomato, Fish     Drinks 2% milk with every meal and OJ.     Snacks: raisins and carrots     Eating less overall - 2 meals per day- small breakfast and larger dinner     Exercise: None - states back/knee pain too severe to engage in walks of any distance or length or standing for too long. Discussed chair exercises/ water aerobics- patient is not interested at this time    Allergies   Allergen Reactions    Clarithromycin Nausea Only and Nausea/vomiting    Penicillins Rash       Objective     Current DM Pharmacotherapy:   Mounjaro 10 mg/0.5 mL - once weekly (Wed/Thursday)    Clarifications to above regimen: 3rd dose of 10 mg dose on Wednesday   Adverse Effects: Constipation - same as previous doses     SECONDARY PREVENTION  - Statin? Yes, Atorvastatin 40 mg - LDL 27 (4/17/24)   - ACEi/ARB? Yes, Losartan 100 mg  - Aspirin? No    Current monitoring regimen:   Patient is using: glucometer    Testing frequency: Does not currently test     Any episodes of hypoglycemia? No.  Did patient treat episode of hypoglycemia appropriately? N/A    Pertinent PMH Review:  - PMH of Pancreatitis: No  - PMH/FH of Medullary Thyroid Cancer: No  - PMH/FH of Multiple Endocrine Neoplasia (MEN) Type II: No  - PMH of Retinopathy: No  - PMH of Urinary Tract Infections/Yeast Infections: No    Lab " Review  BMP  Lab Results   Component Value Date    CALCIUM 9.6 04/22/2025     04/22/2025    K 4.3 04/22/2025    CO2 28 04/22/2025     04/22/2025    BUN 16 04/22/2025    CREATININE 1.07 04/22/2025    EGFR 74 04/22/2025     LFTs  Lab Results   Component Value Date    ALT 27 04/22/2025    AST 24 04/22/2025    ALKPHOS 137 04/22/2025    BILITOT 1.0 04/22/2025     FLP  Lab Results   Component Value Date    TRIG 81 04/22/2025    CHOL 97 04/22/2025    LDLF 32 04/26/2023    LDLCALC 38 04/22/2025    HDL 43 04/22/2025       The ASCVD Risk score (Adrien ESTRELLA, et al., 2019) failed to calculate for the following reasons:    The valid total cholesterol range is 130 to 320 mg/dL  Urine Microalbumin  Lab Results   Component Value Date    MICROALBCREA 5 04/22/2025     Weight Management  Wt Readings from Last 3 Encounters:   04/22/25 148 kg (326 lb 9.6 oz)   10/22/24 (!) 155 kg (342 lb)   04/17/24 (!) 153 kg (337 lb)      There is no height or weight on file to calculate BMI.   A1C  Lab Results   Component Value Date    HGBA1C 5.8 (H) 04/22/2025    HGBA1C 5.8 (H) 10/22/2024    HGBA1C 5.9 (H) 04/17/2024     Assessment/Plan     Problem List Items Addressed This Visit       Type 2 diabetes mellitus, without long-term current use of insulin (Multi)    Patient's goal A1c is < 7%.  Is pt at goal? Yes, 5.8%  Patient's SMBGs are N/A.     Rationale for plan: Stable, no ADEs, would like to maximize weight loss benefit (down 16 lbs since October).    Medication Changes:  INCREASE  Mounjaro 12.5 mg/0.5 mL - once weekly       Future Considerations:  Titration as tolerated    Monitoring and Education:  Advised to incorporate as much physical activity as possible in day to day                   Follow up: I recommend diabetes care be 4 weeks    Ellen Fernández PharmD, BCACP  Clinical Pharmacy Specialist, Primary Care       Continue all meds under the continuation of care with the referring provider and clinical pharmacy team

## 2025-05-16 NOTE — ASSESSMENT & PLAN NOTE
Patient's goal A1c is < 7%.  Is pt at goal? Yes, 5.8%  Patient's SMBGs are N/A.     Rationale for plan: Stable, no ADEs, would like to maximize weight loss benefit (down 16 lbs since October).    Medication Changes:  INCREASE  Mounjaro 12.5 mg/0.5 mL - once weekly       Future Considerations:  Titration as tolerated    Monitoring and Education:  Advised to incorporate as much physical activity as possible in day to day

## 2025-05-19 ENCOUNTER — PHARMACY VISIT (OUTPATIENT)
Dept: PHARMACY | Facility: CLINIC | Age: 75
End: 2025-05-19
Payer: MEDICARE

## 2025-05-28 ENCOUNTER — APPOINTMENT (OUTPATIENT)
Dept: OPHTHALMOLOGY | Facility: CLINIC | Age: 75
End: 2025-05-28
Payer: MEDICARE

## 2025-06-13 ENCOUNTER — APPOINTMENT (OUTPATIENT)
Dept: PHARMACY | Facility: HOSPITAL | Age: 75
End: 2025-06-13
Payer: MEDICARE

## 2025-06-13 DIAGNOSIS — E11.69 TYPE 2 DIABETES MELLITUS WITH OTHER SPECIFIED COMPLICATION, WITHOUT LONG-TERM CURRENT USE OF INSULIN: ICD-10-CM

## 2025-06-13 PROCEDURE — RXMED WILLOW AMBULATORY MEDICATION CHARGE

## 2025-06-13 NOTE — ASSESSMENT & PLAN NOTE
"Patient's goal A1c is < 7%.  Is pt at goal? Yes, 5.8%  Patient's SMBGs are N/A.     Rationale for plan: Patient reports feeling more bloated \"all the time\" Unable to say if it changes in relation to foods or doses of Mounjaro. Says he just \"feels lazy and bloated\".      Medication Changes:  CONTINUE  Mounjaro 12.5 mg/0.5 mL - once weekly   Patient informed he has refills at [facility] and should contact them    Future Considerations:  Titration as tolerated     Monitoring and Education:  Advised to incorporate as much physical activity as possible in day to day   "

## 2025-06-13 NOTE — PROGRESS NOTES
"  Clinical Pharmacy Appointment  Subjective     Patient ID: Noe Quan \"Arabella" is a 75 y.o. male who presents for Diabetes and Weight Loss.    Referring Provider: Paul Ramirez MD      Patient Assistance for Mounjaro/Xarelto approved through 11/11/25. Will have to be renewed prior to that date to prevent lapse in coverage. Medication(s) will be received at no cost to patient from [facility].    Interval History:       Diabetes  He presents for his follow-up diabetic visit. He has type 2 diabetes mellitus. His disease course has been stable.       Diet: Not drinking water - feels too full     24 hr recall:   Green grapes, blueberry muffin, glass of milk   Ham sandwich with carrots on the side  Blueberry muffin   Banana    Exercise: None - states back/knee pain too severe to engage in walks of any distance or length or standing for too long. Discussed chair exercises/ water aerobics- patient is not interested at this time - does do a small amount of yard work - not interested in walking in neighborhood     Allergies   Allergen Reactions    Clarithromycin Nausea Only and Nausea/vomiting    Penicillins Rash       Objective     Current DM Pharmacotherapy:   Mounjaro 12.5 mg/0.5 mL - once weekly (Wed/Thursday)    Clarifications to above regimen:   Adverse Effects: Constipation - same as previous doses     SECONDARY PREVENTION  - Statin? Yes, Atorvastatin 40 mg - LDL 27 (4/17/24)   - ACEi/ARB? Yes, Losartan 100 mg  - Aspirin? No    Current monitoring regimen:   Patient is using: glucometer    Testing frequency: Does not currently test     Any episodes of hypoglycemia? No.  Did patient treat episode of hypoglycemia appropriately? N/A    Pertinent PMH Review:  - PMH of Pancreatitis: No  - PMH/FH of Medullary Thyroid Cancer: No  - PMH/FH of Multiple Endocrine Neoplasia (MEN) Type II: No  - PMH of Retinopathy: No  - PMH of Urinary Tract Infections/Yeast Infections: No    Lab Review  BMP  Lab Results " "  Component Value Date    CALCIUM 9.6 04/22/2025     04/22/2025    K 4.3 04/22/2025    CO2 28 04/22/2025     04/22/2025    BUN 16 04/22/2025    CREATININE 1.07 04/22/2025    EGFR 74 04/22/2025     LFTs  Lab Results   Component Value Date    ALT 27 04/22/2025    AST 24 04/22/2025    ALKPHOS 137 04/22/2025    BILITOT 1.0 04/22/2025     FLP  Lab Results   Component Value Date    TRIG 81 04/22/2025    CHOL 97 04/22/2025    LDLF 32 04/26/2023    LDLCALC 38 04/22/2025    HDL 43 04/22/2025       The ASCVD Risk score (Adrien ESTRELLA, et al., 2019) failed to calculate for the following reasons:    The valid total cholesterol range is 130 to 320 mg/dL  Urine Microalbumin  Lab Results   Component Value Date    MICROALBCREA 5 04/22/2025     Weight Management  Wt Readings from Last 3 Encounters:   04/22/25 148 kg (326 lb 9.6 oz)   10/22/24 (!) 155 kg (342 lb)   04/17/24 (!) 153 kg (337 lb)      There is no height or weight on file to calculate BMI.   A1C  Lab Results   Component Value Date    HGBA1C 5.8 (H) 04/22/2025    HGBA1C 5.8 (H) 10/22/2024    HGBA1C 5.9 (H) 04/17/2024     Assessment/Plan     Problem List Items Addressed This Visit       Type 2 diabetes mellitus, without long-term current use of insulin (Multi)    Patient's goal A1c is < 7%.  Is pt at goal? Yes, 5.8%  Patient's SMBGs are N/A.     Rationale for plan: Patient reports feeling more bloated \"all the time\" Unable to say if it changes in relation to foods or doses of Mounjaro. Says he just \"feels lazy and bloated\".      Medication Changes:  CONTINUE  Mounjaro 12.5 mg/0.5 mL - once weekly   Patient informed he has refills at [facility] and should contact them    Future Considerations:  Titration as tolerated     Monitoring and Education:  Advised to incorporate as much physical activity as possible in day to day           Follow up: I recommend diabetes care be 4 weeks  Patient also advsied that he has refills of Xarelto available and to contact PAULETTE Vicente " either via phone (number provided) or [employer] [facility]    Ellen CortezD, BCACP  Clinical Pharmacy Specialist, Primary Care       Continue all meds under the continuation of care with the referring provider and clinical pharmacy team

## 2025-06-17 ENCOUNTER — PHARMACY VISIT (OUTPATIENT)
Dept: PHARMACY | Facility: CLINIC | Age: 75
End: 2025-06-17
Payer: MEDICARE

## 2025-07-15 ENCOUNTER — APPOINTMENT (OUTPATIENT)
Dept: PHARMACY | Facility: HOSPITAL | Age: 75
End: 2025-07-15
Payer: MEDICARE

## 2025-07-15 DIAGNOSIS — E11.69 TYPE 2 DIABETES MELLITUS WITH OTHER SPECIFIED COMPLICATION, WITHOUT LONG-TERM CURRENT USE OF INSULIN: ICD-10-CM

## 2025-07-15 DIAGNOSIS — E78.2 MIXED HYPERLIPIDEMIA: ICD-10-CM

## 2025-07-15 DIAGNOSIS — N40.0 BENIGN PROSTATIC HYPERPLASIA, UNSPECIFIED WHETHER LOWER URINARY TRACT SYMPTOMS PRESENT: ICD-10-CM

## 2025-07-15 PROCEDURE — RXMED WILLOW AMBULATORY MEDICATION CHARGE

## 2025-07-15 RX ORDER — TIRZEPATIDE 12.5 MG/.5ML
12.5 INJECTION, SOLUTION SUBCUTANEOUS
Qty: 2 ML | Refills: 2 | Status: SHIPPED | OUTPATIENT
Start: 2025-07-15

## 2025-07-15 NOTE — ASSESSMENT & PLAN NOTE
Patient's goal A1c is < 7%.  Is pt at goal? Yes, 5.8%  Patient's SMBGs are N/A.     Rationale for plan: Patient reports improvement in constipation and bloating. Still reported extremely limited appetite     Medication Changes:  CONTINUE  Mounjaro 12.5 mg/0.5 mL - once weekly (Wed/Thursday)    Future Considerations:  Mounjaro titration if appetite returns

## 2025-07-15 NOTE — PROGRESS NOTES
"  Clinical Pharmacy Appointment  Subjective     Patient ID: Noe Quan \"Arabella" is a 75 y.o. male who presents for Diabetes.    Referring Provider: Paul Ramirez MD      Patient Assistance for Mounjaro/Xarelto approved through 11/11/25. Will have to be renewed prior to that date to prevent lapse in coverage. Medication(s) will be received at no cost to patient from [facility].    Interval History:       Diabetes  He presents for his follow-up diabetic visit. He has type 2 diabetes mellitus. His disease course has been stable.       Diet: 8 oz water with metamucil- drinks lemonade and ginger ale     24 hr recall:   Ham and cheese sandwich with glass of 2% milk   Plum     Drinks 2 large glasses of milk daily     Exercise: None - states back/knee pain too severe to engage in walks of any distance or length or standing for too long. Discussed chair exercises/ water aerobics- patient is not interested at this time - does do a small amount of yard work - not interested in walking in neighborhood     Allergies   Allergen Reactions    Clarithromycin Nausea Only and Nausea/vomiting    Penicillins Rash       Objective     Current DM Pharmacotherapy:   Mounjaro 12.5 mg/0.5 mL - once weekly (Wed/Thursday)    Clarifications to above regimen:   Adverse Effects: Constipation - slightly better than before     SECONDARY PREVENTION  - Statin? Yes, Atorvastatin 40 mg - LDL 27 (4/17/24)   - ACEi/ARB? Yes, Losartan 100 mg  - Aspirin? No    Current monitoring regimen:   Patient is using: glucometer    Testing frequency: Does not currently test     Any episodes of hypoglycemia? No.  Did patient treat episode of hypoglycemia appropriately? N/A    Pertinent PMH Review:  - PMH of Pancreatitis: No  - PMH/FH of Medullary Thyroid Cancer: No  - PMH/FH of Multiple Endocrine Neoplasia (MEN) Type II: No  - PMH of Retinopathy: No  - PMH of Urinary Tract Infections/Yeast Infections: No    Lab Review  BMP  Lab Results "   Component Value Date    CALCIUM 9.6 04/22/2025     04/22/2025    K 4.3 04/22/2025    CO2 28 04/22/2025     04/22/2025    BUN 16 04/22/2025    CREATININE 1.07 04/22/2025    EGFR 74 04/22/2025     LFTs  Lab Results   Component Value Date    ALT 27 04/22/2025    AST 24 04/22/2025    ALKPHOS 137 04/22/2025    BILITOT 1.0 04/22/2025     FLP  Lab Results   Component Value Date    TRIG 81 04/22/2025    CHOL 97 04/22/2025    LDLF 32 04/26/2023    LDLCALC 38 04/22/2025    HDL 43 04/22/2025       The ASCVD Risk score (Adrien ESTRELLA, et al., 2019) failed to calculate for the following reasons:    The valid total cholesterol range is 130 to 320 mg/dL  Urine Microalbumin  Lab Results   Component Value Date    MICROALBCREA 5 04/22/2025     Weight Management  Wt Readings from Last 3 Encounters:   04/22/25 148 kg (326 lb 9.6 oz)   10/22/24 (!) 155 kg (342 lb)   04/17/24 (!) 153 kg (337 lb)      There is no height or weight on file to calculate BMI.   A1C  Lab Results   Component Value Date    HGBA1C 5.8 (H) 04/22/2025    HGBA1C 5.8 (H) 10/22/2024    HGBA1C 5.9 (H) 04/17/2024     Assessment/Plan     Problem List Items Addressed This Visit       Type 2 diabetes mellitus, without long-term current use of insulin (Multi)    Patient's goal A1c is < 7%.  Is pt at goal? Yes, 5.8%  Patient's SMBGs are N/A.     Rationale for plan: Patient reports improvement in constipation and bloating. Still reported extremely limited appetite     Medication Changes:  CONTINUE  Mounjaro 12.5 mg/0.5 mL - once weekly (Wed/Thursday)    Future Considerations:  Mounjaro titration if appetite returns              Follow up: I recommend diabetes care be 4 weeks    Ellen Fernández PharmD, BCACP  Clinical Pharmacy Specialist, Primary Care       Continue all meds under the continuation of care with the referring provider and clinical pharmacy team

## 2025-07-16 ENCOUNTER — PHARMACY VISIT (OUTPATIENT)
Dept: PHARMACY | Facility: CLINIC | Age: 75
End: 2025-07-16
Payer: MEDICARE

## 2025-07-16 RX ORDER — TERAZOSIN 10 MG/1
10 CAPSULE ORAL DAILY
Qty: 90 CAPSULE | Refills: 1 | Status: SHIPPED | OUTPATIENT
Start: 2025-07-16

## 2025-07-16 RX ORDER — ATORVASTATIN CALCIUM 40 MG/1
40 TABLET, FILM COATED ORAL NIGHTLY
Qty: 90 TABLET | Refills: 1 | Status: SHIPPED | OUTPATIENT
Start: 2025-07-16

## 2025-08-12 ENCOUNTER — APPOINTMENT (OUTPATIENT)
Dept: PHARMACY | Facility: HOSPITAL | Age: 75
End: 2025-08-12
Payer: MEDICARE

## 2025-08-12 DIAGNOSIS — E11.69 TYPE 2 DIABETES MELLITUS WITH OTHER SPECIFIED COMPLICATION, WITHOUT LONG-TERM CURRENT USE OF INSULIN: Primary | ICD-10-CM

## 2025-08-12 PROCEDURE — RXMED WILLOW AMBULATORY MEDICATION CHARGE

## 2025-08-12 RX ORDER — TIRZEPATIDE 15 MG/.5ML
15 INJECTION, SOLUTION SUBCUTANEOUS
Qty: 2 ML | Refills: 3 | Status: SHIPPED | OUTPATIENT
Start: 2025-08-12

## 2025-08-13 ENCOUNTER — PHARMACY VISIT (OUTPATIENT)
Dept: PHARMACY | Facility: CLINIC | Age: 75
End: 2025-08-13
Payer: MEDICARE

## 2025-09-09 ENCOUNTER — APPOINTMENT (OUTPATIENT)
Dept: PHARMACY | Facility: HOSPITAL | Age: 75
End: 2025-09-09
Payer: MEDICARE

## 2025-10-23 ENCOUNTER — APPOINTMENT (OUTPATIENT)
Dept: PRIMARY CARE | Facility: CLINIC | Age: 75
End: 2025-10-23
Payer: MEDICARE